# Patient Record
Sex: FEMALE | Race: WHITE | Employment: UNEMPLOYED | URBAN - METROPOLITAN AREA
[De-identification: names, ages, dates, MRNs, and addresses within clinical notes are randomized per-mention and may not be internally consistent; named-entity substitution may affect disease eponyms.]

---

## 2017-02-07 DIAGNOSIS — F32.1 MAJOR DEPRESSIVE DISORDER, SINGLE EPISODE, MODERATE (HCC): ICD-10-CM

## 2017-02-07 DIAGNOSIS — F41.1 GENERALIZED ANXIETY DISORDER: ICD-10-CM

## 2017-02-07 RX ORDER — VENLAFAXINE HYDROCHLORIDE 37.5 MG/1
37.5 CAPSULE, EXTENDED RELEASE ORAL DAILY
Qty: 30 CAPSULE | Refills: 2 | Status: SHIPPED | OUTPATIENT
Start: 2017-02-07 | End: 2017-05-09 | Stop reason: SDUPTHER

## 2017-04-24 DIAGNOSIS — G40.309 GENERALIZED CONVULSIVE EPILEPSY WITHOUT INTRACTABLE EPILEPSY (HCC): ICD-10-CM

## 2017-04-24 RX ORDER — PHENYTOIN SODIUM 100 MG/1
200 CAPSULE, EXTENDED RELEASE ORAL 2 TIMES DAILY
Qty: 120 CAPSULE | Refills: 0 | OUTPATIENT
Start: 2017-04-24 | End: 2018-04-24

## 2017-04-25 RX ORDER — PHENYTOIN SODIUM 100 MG/1
CAPSULE, EXTENDED RELEASE ORAL
Qty: 120 CAPSULE | Refills: 0 | Status: SHIPPED | OUTPATIENT
Start: 2017-04-25 | End: 2017-05-24 | Stop reason: SDUPTHER

## 2017-05-09 DIAGNOSIS — F41.1 GENERALIZED ANXIETY DISORDER: ICD-10-CM

## 2017-05-09 DIAGNOSIS — F32.1 MAJOR DEPRESSIVE DISORDER, SINGLE EPISODE, MODERATE (HCC): ICD-10-CM

## 2017-05-09 RX ORDER — VENLAFAXINE HYDROCHLORIDE 37.5 MG/1
37.5 CAPSULE, EXTENDED RELEASE ORAL DAILY
Qty: 30 CAPSULE | Refills: 0 | Status: SHIPPED | OUTPATIENT
Start: 2017-05-09 | End: 2017-06-08 | Stop reason: SDUPTHER

## 2017-05-24 DIAGNOSIS — G40.309 GENERALIZED CONVULSIVE EPILEPSY WITHOUT INTRACTABLE EPILEPSY (HCC): ICD-10-CM

## 2017-05-25 RX ORDER — PHENYTOIN SODIUM 100 MG/1
CAPSULE, EXTENDED RELEASE ORAL
Qty: 120 CAPSULE | Refills: 1 | Status: SHIPPED | OUTPATIENT
Start: 2017-05-25 | End: 2017-06-29 | Stop reason: SDUPTHER

## 2017-06-08 DIAGNOSIS — F32.1 MAJOR DEPRESSIVE DISORDER, SINGLE EPISODE, MODERATE (HCC): ICD-10-CM

## 2017-06-08 DIAGNOSIS — F41.1 GENERALIZED ANXIETY DISORDER: ICD-10-CM

## 2017-06-08 RX ORDER — VENLAFAXINE HYDROCHLORIDE 37.5 MG/1
37.5 CAPSULE, EXTENDED RELEASE ORAL DAILY
Qty: 30 CAPSULE | Refills: 0 | Status: SHIPPED | OUTPATIENT
Start: 2017-06-08 | End: 2017-06-16 | Stop reason: SDUPTHER

## 2017-06-16 ENCOUNTER — OFFICE VISIT (OUTPATIENT)
Dept: FAMILY MEDICINE CLINIC | Age: 57
End: 2017-06-16
Payer: MEDICAID

## 2017-06-16 VITALS
TEMPERATURE: 97.6 F | DIASTOLIC BLOOD PRESSURE: 74 MMHG | HEART RATE: 82 BPM | HEIGHT: 62 IN | SYSTOLIC BLOOD PRESSURE: 120 MMHG | OXYGEN SATURATION: 98 % | RESPIRATION RATE: 16 BRPM | WEIGHT: 108 LBS | BODY MASS INDEX: 19.88 KG/M2

## 2017-06-16 DIAGNOSIS — F41.1 GENERALIZED ANXIETY DISORDER: ICD-10-CM

## 2017-06-16 DIAGNOSIS — G89.29 CHRONIC LEFT-SIDED LOW BACK PAIN WITH LEFT-SIDED SCIATICA: ICD-10-CM

## 2017-06-16 DIAGNOSIS — Z12.11 SCREENING FOR COLON CANCER: ICD-10-CM

## 2017-06-16 DIAGNOSIS — F32.1 MAJOR DEPRESSIVE DISORDER, SINGLE EPISODE, MODERATE (HCC): Primary | Chronic | ICD-10-CM

## 2017-06-16 DIAGNOSIS — R56.9 SEIZURES (HCC): Chronic | ICD-10-CM

## 2017-06-16 DIAGNOSIS — M54.42 CHRONIC LEFT-SIDED LOW BACK PAIN WITH LEFT-SIDED SCIATICA: ICD-10-CM

## 2017-06-16 DIAGNOSIS — Z01.89 ROUTINE LAB DRAW: ICD-10-CM

## 2017-06-16 PROCEDURE — 99214 OFFICE O/P EST MOD 30 MIN: CPT | Performed by: NURSE PRACTITIONER

## 2017-06-16 RX ORDER — VENLAFAXINE HYDROCHLORIDE 37.5 MG/1
75 CAPSULE, EXTENDED RELEASE ORAL DAILY
Qty: 60 CAPSULE | Refills: 1 | Status: SHIPPED | OUTPATIENT
Start: 2017-06-16 | End: 2017-06-27 | Stop reason: SDUPTHER

## 2017-06-16 ASSESSMENT — ENCOUNTER SYMPTOMS
BLOOD IN STOOL: 0
BACK PAIN: 1
VOICE CHANGE: 1
SHORTNESS OF BREATH: 0
SINUS PRESSURE: 0
COUGH: 0
SORE THROAT: 0
VOMITING: 0
EYES NEGATIVE: 1
NAUSEA: 0
ABDOMINAL PAIN: 0
RHINORRHEA: 0
CONSTIPATION: 0
DIARRHEA: 0

## 2017-06-16 ASSESSMENT — PATIENT HEALTH QUESTIONNAIRE - PHQ9
1. LITTLE INTEREST OR PLEASURE IN DOING THINGS: 0
SUM OF ALL RESPONSES TO PHQ9 QUESTIONS 1 & 2: 0
SUM OF ALL RESPONSES TO PHQ QUESTIONS 1-9: 0
2. FEELING DOWN, DEPRESSED OR HOPELESS: 0

## 2017-06-26 ENCOUNTER — HOSPITAL ENCOUNTER (OUTPATIENT)
Facility: CLINIC | Age: 57
Discharge: HOME | End: 2017-06-26
Payer: MEDICAID

## 2017-06-26 ENCOUNTER — HOSPITAL ENCOUNTER (OUTPATIENT)
Dept: GENERAL RADIOLOGY | Facility: CLINIC | Age: 57
Discharge: HOME | End: 2017-06-26
Payer: MEDICAID

## 2017-06-26 DIAGNOSIS — G89.29 CHRONIC IDIOPATHIC ANAL PAIN: ICD-10-CM

## 2017-06-26 DIAGNOSIS — K62.89 CHRONIC IDIOPATHIC ANAL PAIN: ICD-10-CM

## 2017-06-26 DIAGNOSIS — M54.42 ACUTE BACK PAIN WITH SCIATICA, LEFT: ICD-10-CM

## 2017-06-26 PROCEDURE — 72110 X-RAY EXAM L-2 SPINE 4/>VWS: CPT

## 2017-06-27 ENCOUNTER — TELEPHONE (OUTPATIENT)
Dept: FAMILY MEDICINE CLINIC | Age: 57
End: 2017-06-27

## 2017-06-27 DIAGNOSIS — F32.1 MAJOR DEPRESSIVE DISORDER, SINGLE EPISODE, MODERATE (HCC): Chronic | ICD-10-CM

## 2017-06-27 DIAGNOSIS — R93.7 ABNORMAL X-RAY OF LUMBAR SPINE: ICD-10-CM

## 2017-06-27 DIAGNOSIS — F41.1 GENERALIZED ANXIETY DISORDER: ICD-10-CM

## 2017-06-27 DIAGNOSIS — M48.061 NARROWING OF LUMBAR SPINE: Primary | ICD-10-CM

## 2017-06-27 RX ORDER — VENLAFAXINE HYDROCHLORIDE 75 MG/1
75 CAPSULE, EXTENDED RELEASE ORAL DAILY
Qty: 30 CAPSULE | Refills: 1 | Status: SHIPPED | OUTPATIENT
Start: 2017-06-27 | End: 2017-08-11 | Stop reason: SDUPTHER

## 2017-06-29 ENCOUNTER — HOSPITAL ENCOUNTER (OUTPATIENT)
Age: 57
Setting detail: SPECIMEN
Discharge: HOME | End: 2017-06-29
Payer: MEDICAID

## 2017-06-29 ENCOUNTER — OFFICE VISIT (OUTPATIENT)
Dept: NEUROLOGY | Age: 57
End: 2017-06-29
Payer: MEDICAID

## 2017-06-29 VITALS
WEIGHT: 109.2 LBS | HEIGHT: 62 IN | DIASTOLIC BLOOD PRESSURE: 75 MMHG | HEART RATE: 100 BPM | SYSTOLIC BLOOD PRESSURE: 112 MMHG | BODY MASS INDEX: 20.09 KG/M2

## 2017-06-29 DIAGNOSIS — G40.309 GENERALIZED CONVULSIVE EPILEPSY WITHOUT INTRACTABLE EPILEPSY (HCC): Primary | ICD-10-CM

## 2017-06-29 DIAGNOSIS — G40.309 GENERALIZED CONVULSIVE EPILEPSY WITHOUT INTRACTABLE EPILEPSY (HCC): ICD-10-CM

## 2017-06-29 DIAGNOSIS — G56.21 ULNAR NEUROPATHY OF RIGHT UPPER EXTREMITY: ICD-10-CM

## 2017-06-29 DIAGNOSIS — G56.01 CARPAL TUNNEL SYNDROME OF RIGHT WRIST: ICD-10-CM

## 2017-06-29 DIAGNOSIS — Z51.81 SUBTHERAPEUTIC PHENYTOIN LEVEL: ICD-10-CM

## 2017-06-29 PROBLEM — R78.89 SUBTHERAPEUTIC PHENYTOIN LEVEL: Status: ACTIVE | Noted: 2017-06-29

## 2017-06-29 LAB
PHENYTOIN DATE LAST DOSE: ABNORMAL
PHENYTOIN DOSE AMOUNT: ABNORMAL
PHENYTOIN DOSE TIME: ABNORMAL
PHENYTOIN FREE: 0.6 UG/ML (ref 1–2)
PHENYTOIN LEVEL: 6.3 UG/ML (ref 10–20)

## 2017-06-29 PROCEDURE — 99214 OFFICE O/P EST MOD 30 MIN: CPT | Performed by: PSYCHIATRY & NEUROLOGY

## 2017-06-29 RX ORDER — PHENYTOIN SODIUM 100 MG/1
CAPSULE, EXTENDED RELEASE ORAL
Qty: 120 CAPSULE | Refills: 1 | Status: SHIPPED | OUTPATIENT
Start: 2017-06-29 | End: 2017-07-06 | Stop reason: SDUPTHER

## 2017-07-03 ENCOUNTER — TELEPHONE (OUTPATIENT)
Dept: FAMILY MEDICINE CLINIC | Age: 57
End: 2017-07-03

## 2017-07-03 ENCOUNTER — NURSE ONLY (OUTPATIENT)
Dept: FAMILY MEDICINE CLINIC | Age: 57
End: 2017-07-03
Payer: MEDICAID

## 2017-07-03 DIAGNOSIS — Z12.11 SCREENING FOR COLON CANCER: ICD-10-CM

## 2017-07-03 DIAGNOSIS — R19.5 POSITIVE FIT (FECAL IMMUNOCHEMICAL TEST): Primary | ICD-10-CM

## 2017-07-03 LAB
CONTROL: PRESENT
HEMOCCULT STL QL: POSITIVE

## 2017-07-03 PROCEDURE — 82274 ASSAY TEST FOR BLOOD FECAL: CPT | Performed by: NURSE PRACTITIONER

## 2017-07-06 ENCOUNTER — TELEPHONE (OUTPATIENT)
Dept: NEUROLOGY | Age: 57
End: 2017-07-06

## 2017-07-06 DIAGNOSIS — G40.309 GENERALIZED CONVULSIVE EPILEPSY WITHOUT INTRACTABLE EPILEPSY (HCC): Primary | ICD-10-CM

## 2017-07-06 RX ORDER — PHENYTOIN SODIUM 100 MG/1
CAPSULE, EXTENDED RELEASE ORAL
Qty: 150 CAPSULE | Refills: 1 | Status: SHIPPED | OUTPATIENT
Start: 2017-07-06 | End: 2017-09-20 | Stop reason: SDUPTHER

## 2017-07-10 ENCOUNTER — HOSPITAL ENCOUNTER (OUTPATIENT)
Dept: MRI IMAGING | Facility: CLINIC | Age: 57
Discharge: HOME | End: 2017-07-10
Payer: MEDICAID

## 2017-07-10 DIAGNOSIS — R93.7 ABNORMAL X-RAY OF LUMBAR SPINE: ICD-10-CM

## 2017-07-10 DIAGNOSIS — M48.061 NARROWING OF LUMBAR SPINE: ICD-10-CM

## 2017-07-10 PROCEDURE — 72148 MRI LUMBAR SPINE W/O DYE: CPT

## 2017-07-13 DIAGNOSIS — M51.36 LUMBAR DISC NARROWING: Primary | ICD-10-CM

## 2017-07-13 DIAGNOSIS — M51.36 BULGING LUMBAR DISC: ICD-10-CM

## 2017-08-11 DIAGNOSIS — F41.1 GENERALIZED ANXIETY DISORDER: ICD-10-CM

## 2017-08-11 DIAGNOSIS — F32.1 MAJOR DEPRESSIVE DISORDER, SINGLE EPISODE, MODERATE (HCC): Chronic | ICD-10-CM

## 2017-08-11 RX ORDER — VENLAFAXINE HYDROCHLORIDE 75 MG/1
75 CAPSULE, EXTENDED RELEASE ORAL DAILY
Qty: 30 CAPSULE | Refills: 2 | Status: SHIPPED | OUTPATIENT
Start: 2017-08-11 | End: 2017-08-15 | Stop reason: SDUPTHER

## 2017-08-14 ENCOUNTER — TELEPHONE (OUTPATIENT)
Dept: FAMILY MEDICINE CLINIC | Age: 57
End: 2017-08-14

## 2017-08-14 DIAGNOSIS — F41.1 GENERALIZED ANXIETY DISORDER: ICD-10-CM

## 2017-08-14 DIAGNOSIS — F32.1 MAJOR DEPRESSIVE DISORDER, SINGLE EPISODE, MODERATE (HCC): Chronic | ICD-10-CM

## 2017-08-15 RX ORDER — VENLAFAXINE HYDROCHLORIDE 75 MG/1
75 CAPSULE, EXTENDED RELEASE ORAL DAILY
Qty: 10 CAPSULE | Refills: 0 | Status: SHIPPED | OUTPATIENT
Start: 2017-08-15 | End: 2017-08-22 | Stop reason: SDUPTHER

## 2017-08-16 ENCOUNTER — OFFICE VISIT (OUTPATIENT)
Dept: GASTROENTEROLOGY | Age: 57
End: 2017-08-16
Payer: MEDICAID

## 2017-08-16 VITALS
WEIGHT: 107.4 LBS | SYSTOLIC BLOOD PRESSURE: 105 MMHG | BODY MASS INDEX: 19.77 KG/M2 | HEART RATE: 58 BPM | OXYGEN SATURATION: 100 % | HEIGHT: 62 IN | TEMPERATURE: 98.1 F | DIASTOLIC BLOOD PRESSURE: 77 MMHG | RESPIRATION RATE: 14 BRPM

## 2017-08-16 DIAGNOSIS — R19.5 POSITIVE FIT (FECAL IMMUNOCHEMICAL TEST): ICD-10-CM

## 2017-08-16 DIAGNOSIS — C32.0 VOCAL CORD CANCER (HCC): Primary | ICD-10-CM

## 2017-08-16 PROCEDURE — 99244 OFF/OP CNSLTJ NEW/EST MOD 40: CPT | Performed by: INTERNAL MEDICINE

## 2017-08-16 RX ORDER — GABAPENTIN 100 MG/1
100 CAPSULE ORAL 3 TIMES DAILY
COMMUNITY
End: 2017-08-22 | Stop reason: DRUGHIGH

## 2017-08-16 ASSESSMENT — ENCOUNTER SYMPTOMS
GASTROINTESTINAL NEGATIVE: 1
VOMITING: 0
EYES NEGATIVE: 1
DIARRHEA: 0
RECTAL PAIN: 0
BACK PAIN: 1
CONSTIPATION: 0
ABDOMINAL PAIN: 0
BLOOD IN STOOL: 0
ANAL BLEEDING: 0
NAUSEA: 0
TROUBLE SWALLOWING: 0
RESPIRATORY NEGATIVE: 1
ALLERGIC/IMMUNOLOGIC NEGATIVE: 1
ABDOMINAL DISTENTION: 0
VOICE CHANGE: 1

## 2017-08-22 ENCOUNTER — OFFICE VISIT (OUTPATIENT)
Dept: FAMILY MEDICINE CLINIC | Age: 57
End: 2017-08-22
Payer: MEDICAID

## 2017-08-22 VITALS
RESPIRATION RATE: 16 BRPM | WEIGHT: 108 LBS | SYSTOLIC BLOOD PRESSURE: 128 MMHG | DIASTOLIC BLOOD PRESSURE: 74 MMHG | HEART RATE: 84 BPM | BODY MASS INDEX: 19.75 KG/M2 | TEMPERATURE: 98.7 F

## 2017-08-22 DIAGNOSIS — F32.1 MAJOR DEPRESSIVE DISORDER, SINGLE EPISODE, MODERATE (HCC): Primary | Chronic | ICD-10-CM

## 2017-08-22 DIAGNOSIS — F41.1 GENERALIZED ANXIETY DISORDER: ICD-10-CM

## 2017-08-22 PROBLEM — M51.26 HERNIATED LUMBAR INTERVERTEBRAL DISC: Status: ACTIVE | Noted: 2017-08-09

## 2017-08-22 PROCEDURE — 99214 OFFICE O/P EST MOD 30 MIN: CPT | Performed by: NURSE PRACTITIONER

## 2017-08-22 RX ORDER — VENLAFAXINE 37.5 MG/1
37.5 TABLET ORAL DAILY
Qty: 30 TABLET | Refills: 3 | Status: SHIPPED | OUTPATIENT
Start: 2017-08-22 | End: 2017-09-20 | Stop reason: DRUGHIGH

## 2017-08-22 RX ORDER — GABAPENTIN 300 MG/1
600 CAPSULE ORAL 3 TIMES DAILY
COMMUNITY
Start: 2017-08-09 | End: 2017-11-10 | Stop reason: SDUPTHER

## 2017-08-22 RX ORDER — POLYETHYLENE GLYCOL 3350 17 G/17G
POWDER, FOR SOLUTION ORAL
Qty: 255 G | Refills: 0 | Status: SHIPPED | OUTPATIENT
Start: 2017-08-22 | End: 2017-09-15

## 2017-08-22 RX ORDER — VENLAFAXINE HYDROCHLORIDE 75 MG/1
75 CAPSULE, EXTENDED RELEASE ORAL DAILY
Qty: 30 CAPSULE | Refills: 3 | Status: SHIPPED | OUTPATIENT
Start: 2017-08-22 | End: 2017-09-20 | Stop reason: SDUPTHER

## 2017-08-22 ASSESSMENT — ENCOUNTER SYMPTOMS
EYES NEGATIVE: 1
WHEEZING: 0
DIARRHEA: 0
CONSTIPATION: 0
COUGH: 0
SINUS PRESSURE: 0
VOMITING: 0
RHINORRHEA: 0
BLOOD IN STOOL: 0
ABDOMINAL PAIN: 0
NAUSEA: 0
SORE THROAT: 0
VOICE CHANGE: 1
BACK PAIN: 1
SHORTNESS OF BREATH: 0

## 2017-09-20 ENCOUNTER — OFFICE VISIT (OUTPATIENT)
Dept: FAMILY MEDICINE CLINIC | Age: 57
End: 2017-09-20
Payer: MEDICAID

## 2017-09-20 VITALS
WEIGHT: 106 LBS | HEART RATE: 96 BPM | RESPIRATION RATE: 20 BRPM | BODY MASS INDEX: 19.39 KG/M2 | TEMPERATURE: 98.7 F | SYSTOLIC BLOOD PRESSURE: 124 MMHG | DIASTOLIC BLOOD PRESSURE: 82 MMHG

## 2017-09-20 DIAGNOSIS — G40.309 GENERALIZED CONVULSIVE EPILEPSY WITHOUT INTRACTABLE EPILEPSY (HCC): ICD-10-CM

## 2017-09-20 DIAGNOSIS — F41.1 GENERALIZED ANXIETY DISORDER: ICD-10-CM

## 2017-09-20 DIAGNOSIS — F32.1 MAJOR DEPRESSIVE DISORDER, SINGLE EPISODE, MODERATE (HCC): Chronic | ICD-10-CM

## 2017-09-20 PROCEDURE — 99214 OFFICE O/P EST MOD 30 MIN: CPT | Performed by: NURSE PRACTITIONER

## 2017-09-20 RX ORDER — VENLAFAXINE HYDROCHLORIDE 75 MG/1
150 CAPSULE, EXTENDED RELEASE ORAL DAILY
Qty: 60 CAPSULE | Refills: 3 | Status: SHIPPED | OUTPATIENT
Start: 2017-09-20 | End: 2018-01-17 | Stop reason: SDUPTHER

## 2017-09-20 RX ORDER — PHENYTOIN SODIUM 100 MG/1
CAPSULE, EXTENDED RELEASE ORAL
Qty: 150 CAPSULE | Refills: 2 | Status: SHIPPED | OUTPATIENT
Start: 2017-09-20 | End: 2017-12-15 | Stop reason: SDUPTHER

## 2017-09-20 ASSESSMENT — ENCOUNTER SYMPTOMS
CONSTIPATION: 0
BLOOD IN STOOL: 0
SORE THROAT: 0
ABDOMINAL PAIN: 0
VOICE CHANGE: 1
SHORTNESS OF BREATH: 0
BACK PAIN: 1
RHINORRHEA: 0
WHEEZING: 0
EYES NEGATIVE: 1
COUGH: 0
NAUSEA: 0
SINUS PRESSURE: 0
DIARRHEA: 0
VOMITING: 0

## 2017-10-03 ENCOUNTER — ANESTHESIA EVENT (OUTPATIENT)
Dept: OPERATING ROOM | Age: 57
End: 2017-10-03
Payer: MEDICAID

## 2017-10-04 ENCOUNTER — ANESTHESIA (OUTPATIENT)
Dept: OPERATING ROOM | Age: 57
End: 2017-10-04
Payer: MEDICAID

## 2017-10-04 ENCOUNTER — HOSPITAL ENCOUNTER (OUTPATIENT)
Age: 57
Setting detail: OUTPATIENT SURGERY
Discharge: HOME | End: 2017-10-04
Attending: INTERNAL MEDICINE | Admitting: INTERNAL MEDICINE
Payer: MEDICAID

## 2017-10-04 VITALS
DIASTOLIC BLOOD PRESSURE: 68 MMHG | OXYGEN SATURATION: 97 % | RESPIRATION RATE: 14 BRPM | SYSTOLIC BLOOD PRESSURE: 136 MMHG

## 2017-10-04 VITALS
RESPIRATION RATE: 20 BRPM | WEIGHT: 105 LBS | OXYGEN SATURATION: 98 % | HEIGHT: 62 IN | DIASTOLIC BLOOD PRESSURE: 68 MMHG | HEART RATE: 82 BPM | BODY MASS INDEX: 19.32 KG/M2 | TEMPERATURE: 98.4 F | SYSTOLIC BLOOD PRESSURE: 134 MMHG

## 2017-10-04 PROCEDURE — 3700000000 HC ANESTHESIA ATTENDED CARE: Performed by: INTERNAL MEDICINE

## 2017-10-04 PROCEDURE — 3609010300 HC COLONOSCOPY W/BIOPSY SINGLE/MULTIPLE: Performed by: INTERNAL MEDICINE

## 2017-10-04 PROCEDURE — 88305 TISSUE EXAM BY PATHOLOGIST: CPT

## 2017-10-04 PROCEDURE — 2580000003 HC RX 258: Performed by: NURSE ANESTHETIST, CERTIFIED REGISTERED

## 2017-10-04 PROCEDURE — 3700000001 HC ADD 15 MINUTES (ANESTHESIA): Performed by: INTERNAL MEDICINE

## 2017-10-04 PROCEDURE — 7100000011 HC PHASE II RECOVERY - ADDTL 15 MIN: Performed by: INTERNAL MEDICINE

## 2017-10-04 PROCEDURE — 6360000002 HC RX W HCPCS: Performed by: NURSE ANESTHETIST, CERTIFIED REGISTERED

## 2017-10-04 PROCEDURE — 2500000003 HC RX 250 WO HCPCS: Performed by: NURSE ANESTHETIST, CERTIFIED REGISTERED

## 2017-10-04 PROCEDURE — 7100000010 HC PHASE II RECOVERY - FIRST 15 MIN: Performed by: INTERNAL MEDICINE

## 2017-10-04 PROCEDURE — 2580000003 HC RX 258: Performed by: ANESTHESIOLOGY

## 2017-10-04 RX ORDER — SODIUM CHLORIDE 9 MG/ML
INJECTION, SOLUTION INTRAVENOUS CONTINUOUS
Status: DISCONTINUED | OUTPATIENT
Start: 2017-10-05 | End: 2017-10-04

## 2017-10-04 RX ORDER — FENTANYL CITRATE 50 UG/ML
25 INJECTION, SOLUTION INTRAMUSCULAR; INTRAVENOUS EVERY 5 MIN PRN
Status: DISCONTINUED | OUTPATIENT
Start: 2017-10-04 | End: 2017-10-04 | Stop reason: HOSPADM

## 2017-10-04 RX ORDER — HYDROCODONE BITARTRATE AND ACETAMINOPHEN 5; 325 MG/1; MG/1
1 TABLET ORAL EVERY 12 HOURS PRN
COMMUNITY
End: 2017-11-22

## 2017-10-04 RX ORDER — SODIUM CHLORIDE 0.9 % (FLUSH) 0.9 %
10 SYRINGE (ML) INJECTION PRN
Status: DISCONTINUED | OUTPATIENT
Start: 2017-10-04 | End: 2017-10-04 | Stop reason: HOSPADM

## 2017-10-04 RX ORDER — SODIUM CHLORIDE, SODIUM LACTATE, POTASSIUM CHLORIDE, CALCIUM CHLORIDE 600; 310; 30; 20 MG/100ML; MG/100ML; MG/100ML; MG/100ML
INJECTION, SOLUTION INTRAVENOUS CONTINUOUS PRN
Status: DISCONTINUED | OUTPATIENT
Start: 2017-10-04 | End: 2017-10-04 | Stop reason: SDUPTHER

## 2017-10-04 RX ORDER — LIDOCAINE HYDROCHLORIDE 20 MG/ML
INJECTION, SOLUTION EPIDURAL; INFILTRATION; INTRACAUDAL; PERINEURAL PRN
Status: DISCONTINUED | OUTPATIENT
Start: 2017-10-04 | End: 2017-10-04 | Stop reason: SDUPTHER

## 2017-10-04 RX ORDER — PROPOFOL 10 MG/ML
INJECTION, EMULSION INTRAVENOUS PRN
Status: DISCONTINUED | OUTPATIENT
Start: 2017-10-04 | End: 2017-10-04 | Stop reason: SDUPTHER

## 2017-10-04 RX ORDER — SODIUM CHLORIDE 0.9 % (FLUSH) 0.9 %
10 SYRINGE (ML) INJECTION EVERY 12 HOURS SCHEDULED
Status: DISCONTINUED | OUTPATIENT
Start: 2017-10-04 | End: 2017-10-04 | Stop reason: HOSPADM

## 2017-10-04 RX ORDER — SODIUM CHLORIDE, SODIUM LACTATE, POTASSIUM CHLORIDE, CALCIUM CHLORIDE 600; 310; 30; 20 MG/100ML; MG/100ML; MG/100ML; MG/100ML
INJECTION, SOLUTION INTRAVENOUS CONTINUOUS
Status: DISCONTINUED | OUTPATIENT
Start: 2017-10-05 | End: 2017-10-04 | Stop reason: HOSPADM

## 2017-10-04 RX ORDER — FENTANYL CITRATE 50 UG/ML
50 INJECTION, SOLUTION INTRAMUSCULAR; INTRAVENOUS EVERY 5 MIN PRN
Status: DISCONTINUED | OUTPATIENT
Start: 2017-10-04 | End: 2017-10-04 | Stop reason: HOSPADM

## 2017-10-04 RX ORDER — LIDOCAINE HYDROCHLORIDE 10 MG/ML
1 INJECTION, SOLUTION EPIDURAL; INFILTRATION; INTRACAUDAL; PERINEURAL
Status: DISCONTINUED | OUTPATIENT
Start: 2017-10-04 | End: 2017-10-04 | Stop reason: HOSPADM

## 2017-10-04 RX ORDER — ONDANSETRON 2 MG/ML
4 INJECTION INTRAMUSCULAR; INTRAVENOUS
Status: DISCONTINUED | OUTPATIENT
Start: 2017-10-04 | End: 2017-10-04 | Stop reason: HOSPADM

## 2017-10-04 RX ADMIN — PROPOFOL 50 MG: 10 INJECTION, EMULSION INTRAVENOUS at 09:31

## 2017-10-04 RX ADMIN — PROPOFOL 100 MG: 10 INJECTION, EMULSION INTRAVENOUS at 09:17

## 2017-10-04 RX ADMIN — PROPOFOL 50 MG: 10 INJECTION, EMULSION INTRAVENOUS at 09:38

## 2017-10-04 RX ADMIN — PROPOFOL 50 MG: 10 INJECTION, EMULSION INTRAVENOUS at 09:18

## 2017-10-04 RX ADMIN — PROPOFOL 50 MG: 10 INJECTION, EMULSION INTRAVENOUS at 09:42

## 2017-10-04 RX ADMIN — PROPOFOL 30 MG: 10 INJECTION, EMULSION INTRAVENOUS at 09:21

## 2017-10-04 RX ADMIN — PROPOFOL 50 MG: 10 INJECTION, EMULSION INTRAVENOUS at 09:45

## 2017-10-04 RX ADMIN — PROPOFOL 50 MG: 10 INJECTION, EMULSION INTRAVENOUS at 09:27

## 2017-10-04 RX ADMIN — SODIUM CHLORIDE, POTASSIUM CHLORIDE, SODIUM LACTATE AND CALCIUM CHLORIDE: 600; 310; 30; 20 INJECTION, SOLUTION INTRAVENOUS at 09:15

## 2017-10-04 RX ADMIN — PROPOFOL 50 MG: 10 INJECTION, EMULSION INTRAVENOUS at 09:40

## 2017-10-04 RX ADMIN — PROPOFOL 50 MG: 10 INJECTION, EMULSION INTRAVENOUS at 09:33

## 2017-10-04 RX ADMIN — PROPOFOL 50 MG: 10 INJECTION, EMULSION INTRAVENOUS at 09:22

## 2017-10-04 RX ADMIN — SODIUM CHLORIDE, POTASSIUM CHLORIDE, SODIUM LACTATE AND CALCIUM CHLORIDE: 600; 310; 30; 20 INJECTION, SOLUTION INTRAVENOUS at 08:48

## 2017-10-04 RX ADMIN — PROPOFOL 50 MG: 10 INJECTION, EMULSION INTRAVENOUS at 09:25

## 2017-10-04 RX ADMIN — PROPOFOL 50 MG: 10 INJECTION, EMULSION INTRAVENOUS at 09:29

## 2017-10-04 RX ADMIN — PROPOFOL 50 MG: 10 INJECTION, EMULSION INTRAVENOUS at 09:19

## 2017-10-04 RX ADMIN — PROPOFOL 50 MG: 10 INJECTION, EMULSION INTRAVENOUS at 09:48

## 2017-10-04 RX ADMIN — PROPOFOL 20 MG: 10 INJECTION, EMULSION INTRAVENOUS at 09:23

## 2017-10-04 RX ADMIN — PROPOFOL 50 MG: 10 INJECTION, EMULSION INTRAVENOUS at 09:35

## 2017-10-04 RX ADMIN — LIDOCAINE HYDROCHLORIDE 100 MG: 20 INJECTION, SOLUTION EPIDURAL; INFILTRATION; INTRACAUDAL; PERINEURAL at 09:17

## 2017-10-04 ASSESSMENT — PAIN SCALES - GENERAL
PAINLEVEL_OUTOF10: 0

## 2017-10-04 ASSESSMENT — PAIN - FUNCTIONAL ASSESSMENT: PAIN_FUNCTIONAL_ASSESSMENT: 0-10

## 2017-10-04 ASSESSMENT — PAIN DESCRIPTION - DESCRIPTORS: DESCRIPTORS: THROBBING

## 2017-10-04 NOTE — IP AVS SNAPSHOT
Patient Information     Patient Name AMAURI Burr 1960         This is your updated medication list to keep with you all times      TAKE these medications     gabapentin 300 MG capsule   Commonly known as:  NEURONTIN       HYDROcodone-acetaminophen 5-325 MG per tablet   Commonly known as:  NORCO       phenytoin 100 MG ER capsule   Commonly known as:  DILANTIN   TAKE TWO CAPSULES BY MOUTH IN THE MORNING AND THREE CAPSULES AT NIGHT       venlafaxine 75 MG extended release capsule   Commonly known as:  EFFEXOR XR   Take 2 capsules by mouth daily

## 2017-10-04 NOTE — ANESTHESIA PRE PROCEDURE
Department of Anesthesiology  Preprocedure Note       Name:  Yanci Rodriguez   Age:  62 y.o.  :  1960                                          MRN:  [id number]         Date:  10/4/2017      Surgeon: Ritika Bowers):  Tadeo Keene MD    Procedure: Procedure(s):  COLONOSCOPY    Medications prior to admission:   Prior to Admission medications    Medication Sig Start Date End Date Taking? Authorizing Provider   HYDROcodone-acetaminophen (NORCO) 5-325 MG per tablet Take 1 tablet by mouth every 12 hours as needed for Pain .    Yes Historical Provider, MD   phenytoin (DILANTIN) 100 MG ER capsule TAKE TWO CAPSULES BY MOUTH IN THE MORNING AND THREE CAPSULES AT NIGHT 17  Yes Tahir Lozano MD   venlafaxine (EFFEXOR XR) 75 MG extended release capsule Take 2 capsules by mouth daily 17 Yes Sarah Leal CNP   gabapentin (NEURONTIN) 300 MG capsule Take 600 mg by mouth 3 times daily 17 Yes Historical Provider, MD       Current medications:    Current Facility-Administered Medications   Medication Dose Route Frequency Provider Last Rate Last Dose    [START ON 10/5/2017] lactated ringers infusion   Intravenous Continuous Ernesto Guerrier  mL/hr at 10/04/17 0848      sodium chloride flush 0.9 % injection 10 mL  10 mL Intravenous 2 times per day Ernesto Guerrier MD        sodium chloride flush 0.9 % injection 10 mL  10 mL Intravenous PRN Ernesto Guerrier MD        lidocaine PF 1 % injection 1 mL  1 mL Intradermal Once PRN Ernesto Guerrier MD           Allergies:  No Known Allergies    Problem List:    Patient Active Problem List   Diagnosis Code    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    Seizures ([address] R56.9    Anxiety F41.9    Depression F32.9    Hypoglycemia E16.2    S/P radiation therapy within last four weeks Z92.3    Major depressive disorder, single episode, moderate ([address] F32.1    Ulnar neuropathy of right upper extremity G56.21    Carpal tunnel syndrome of right wrist G56.01    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    Convulsions ([address] R56.9    Generalized convulsive epilepsy without intractable epilepsy (HCC) G40.309    Subtherapeutic phenytoin level Z51.81    Positive FIT (fecal immunochemical test) R19.5    Herniated lumbar intervertebral disc M51.26       Past Medical History:        Diagnosis Date    Anemia     Anxiety     Arthritis     COPD (chronic obstructive pulmonary disease) ([address]     G tube feedings ([facility] [address] 2/2016    every 3-4 hours    History of blood transfusion 1990    x 6 during child birth    Hypoglycemia     Insomnia     Seizures ([facility] [address] [facility]     Squamous cell carcinoma of vocal cord ([facility] [address] 11/17/2015    with history of radiation treatments       Past Surgical History:        Procedure Laterality Date    BRONCHOSCOPY  11/5/15    DENTAL SURGERY Bilateral     LARYNGOSCOPY Right 11/5/15    UPPER GASTROINTESTINAL ENDOSCOPY  11/5/15    VOCAL CORD AUGMENTATION W/RADIESSE INJ Bilateral        Social History:    Social History   Substance Use Topics    Smoking status: Former Smoker     Packs/day: 1.50     Years: 25.00     Types: Cigarettes     Start date: 1/1/1991     Quit date: 9/29/2016    Smokeless tobacco: Never Used    Alcohol use No                                Counseling given: Not Answered      Vital Signs (Current):   Vitals:    10/04/17 0813 10/04/17 0814   BP: 111/63    Pulse: 90    Resp: 16    Temp: 97.9 °F (36.6 °C)    TempSrc: Oral    SpO2: 99%    Weight:  105 lb (47.6 kg)   Height:  5' 2\" (1.575 m)                                              BP Readings from Last 3 Encounters:   10/04/17 111/63   09/20/17 124/82   08/22/17 128/74       NPO Status: Time of last liquid consumption: 2359                        Time of last solid consumption: 1900                        Date of last liquid consumption: 10/03/17                        Date of last solid food consumption: 10/02/17    BMI:   Wt Readings from Last 3 Encounters: 10/04/17 105 lb (47.6 kg)   09/20/17 106 lb (48.1 kg)   08/22/17 108 lb (49 kg)     Body mass index is 19.2 kg/(m^2). CBC:   Lab Results   Component Value Date    WBC 6.6 04/22/2016    RBC 4.38 04/22/2016    HGB 13.4 04/22/2016    HCT 40.1 04/22/2016    MCV 91.6 04/22/2016    RDW 15.5 04/22/2016     04/22/2016       CMP:   Lab Results   Component Value Date     04/22/2016    K 4.4 04/22/2016    CL 97 04/22/2016    CO2 28 04/22/2016    BUN 22 04/22/2016    CREATININE 0.52 04/22/2016    GFRAA >60 04/22/2016    LABGLOM >60 04/22/2016    GLUCOSE 94 04/22/2016    PROT 7.5 04/22/2016    CALCIUM 9.4 04/22/2016    BILITOT 0.15 04/22/2016    ALKPHOS 104 04/22/2016    AST 17 04/22/2016    ALT 19 04/22/2016       POC Tests: No results for input(s): POCGLU, POCNA, POCK, POCCL, POCBUN, POCHEMO, POCHCT in the last 72 hours. Coags: No results found for: PROTIME, INR, APTT    HCG (If Applicable): No results found for: PREGTESTUR, PREGSERUM, HCG, HCGQUANT     ABGs: No results found for: PHART, PO2ART, NCN2SBU, SDY1EYY, BEART, Q7WCGJDB     Type & Screen (If Applicable):  No results found for: LABABO, LABRH    Anesthesia Evaluation   no history of anesthetic complications:   Airway: Mallampati: II  TM distance: >3 FB   Neck ROM: full  Mouth opening: > = 3 FB Dental:    (+) upper dentures and lower dentures      Pulmonary: breath sounds clear to auscultation  (+) COPD:      (-) asthma, recent URI and sleep apnea    ROS comment: Squamous cell cancer of the vocal cord s/p radiation   Cardiovascular:        (-) pacemaker, hypertension, past MI, dysrhythmias,  angina and  NEWBERRY      Rhythm: regular  Rate: normal                 Neuro/Psych:   (+) seizures: well controlled,    (-) neuromuscular disease and psychiatric history  GI/Hepatic/Renal:        (-) GERD and liver disease     Endo/Other:    (+) : arthritis:.     (-) hypothyroidism, hyperthyroidism    Abdominal:         (-) obese              Anesthesia Plan    ASA 3 MAC     intravenous induction   Anesthetic plan and risks discussed with patient. Plan discussed with CRNA.   Attending anesthesiologist reviewed and agrees with Fina Peres MD   10/4/2017

## 2017-10-04 NOTE — H&P
HISTORY and PHYSICAL    NAME:  Yanci Rodriguez  MRN: [id number]   YOB: 1960   Date: 10/4/2017   Age: 62 y.o. Gender: female         COMPLAINT AND PRESENT HISTORY: Yanci Rodriguez is a 62 y.o. female who is scheduled to have a colonoscopy due to a recent positive FIT test.  The patient denies any abdominal pain, nausea, vomiting, diarrhea, constipation. No dark stools or bleeding. No recent weight loss. No family history of colon cancer. No previous colonoscopy. She has a history of vocal cord cancer diagnosed 11/2015. She has chronic hoarseness. She had a feeding tube placed 2/2016 with subsequent removal.      Diagnosis: Positive FIT test    Past Medical History:   Diagnosis Date    Anemia     Anxiety     Arthritis     [facility]) [facility]     G tube feedings ([facility] 2/2016    every 3-4 hours    Hypoglycemia     Insomnia     Seizures (HCC)     Squamous cell carcinoma of vocal cord ([facility] [facility] 11/17/2015    with history of radiation treatments       Past Surgical History:   Procedure Laterality Date    BRONCHOSCOPY  11/5/15    DENTAL SURGERY Bilateral     LARYNGOSCOPY Right 11/5/15    UPPER GASTROINTESTINAL ENDOSCOPY  11/5/15    VOCAL CORD AUGMENTATION W/RADIESSE INJ Bilateral        Pt denies any history of diabetes, heart disease, stroke or cancer.     Family History   Problem Relation Age of Onset    High Blood Pressure Father     COPD Father     Heart Disease Father     Heart Disease Maternal Grandmother     Diabetes Maternal Grandmother     High Blood Pressure Maternal Grandmother     Heart Disease Maternal Grandfather     Heart Disease Sister     Other Brother      epilepsy    Heart Disease Brother          Social History     Social History    Marital status: Single     Spouse name: N/A    Number of children: N/A    Years of education: N/A     Social History Main Topics    Smoking status: Former Smoker     Packs/day: 1.50     Years: 25.00 Types: Cigarettes     Start date: 1/1/1991     Quit date: 9/29/2016    Smokeless tobacco: Never Used    Alcohol use No    Drug use: 5.00 per week     Special: Marijuana    Sexual activity: No     Other Topics Concern    Not on file     Social History Narrative                 Review of Systems:    No Known Allergies        ROS:    GENERAL HEALTH:  Denies any problems with weight, appetite, or sleep. Skin:  No itching or rashes. No lesions. No easy bruising or bleeding. HEENT:  Denies cephalgia or head injury. No eye or ear pain. No sinusitis, rhinorhea or epistaxis. No frequent sore throat. No current masses  or injury to the neck. Cardio-Respiratory: No hemoptysis, shortness of breath, chest pain, dizziness, orthopnea or palpitations. Gastrointestinal:  No constipation, diarrhea, gracie stools, red or black in the stool. No nausea or vomiting. Genitourinary:  No dysuria, hematuria, discharge, incontinence. No recent urinary tract infection. Locomotor:  Denies bone, joint or muscle  problems. No need for assistance with ambulation. Neuro:  Denies  neuropathy, syncopal episodes, weakness, vertigo, arthralgia, myalgia or numbness or tingling. Behavioral/Psych:  Pt denies current feeling of depression or significant anxiety. GENERAL PHYSICAL EXAM:            Vitals: /63  Pulse 90  Temp 97.9 °F (36.6 °C) (Oral)   Resp 16  Ht 5' 2\" (1.575 m)  Wt 105 lb (47.6 kg)  SpO2 99%  BMI 19.2 kg/m2 Body mass index is 19.2 kg/(m^2). GENERAL APPEARANCE:  Amira Olivas is a 62 y.o. female,  nourished, conscious, alert. Does not appear to be in distress or pain at this time. Skin:  Appears warm and dry without jaundice or cyanosis. No rashes or lesions. HEENT:  Voice is hoarse. No facial swelling or tenderness. No  scleral icterus. No drainage from the ears, eyes or nose. Moist mucous membranes. No jugular vein distention.

## 2017-10-04 NOTE — IP AVS SNAPSHOT
After Visit Summary  (Discharge Instructions)    Medication List for Home    Based on the information you provided to us as well as any changes during this visit, the following is your updated medication list.  Compare this with your prescription bottles at home. If you have any questions or concerns, contact your primary care physician's office. Daily Medication List (This medication list can be shared with any healthcare provider who is helping you manage your medications)      These are medications you told us you were taking at home, CONTINUE taking them after you leave the hospital        Last Dose    Next Dose Due AM NOON PM NIGHT    gabapentin 300 MG capsule   Commonly known as:  NEURONTIN   Take 600 mg by mouth 3 times daily                                         HYDROcodone-acetaminophen 5-325 MG per tablet   Commonly known as:  NORCO   Take 1 tablet by mouth every 12 hours as needed for Pain .                                         phenytoin 100 MG ER capsule   Commonly known as:  DILANTIN   TAKE TWO CAPSULES BY MOUTH IN THE MORNING AND THREE CAPSULES AT NIGHT                                         venlafaxine 75 MG extended release capsule   Commonly known as:  EFFEXOR XR   Take 2 capsules by mouth daily                                                 Allergies as of 10/4/2017     No Known Allergies      Immunizations as of 10/4/2017     No immunizations on file. Last Vitals          Most Recent Value    Temp  98.1 °F (36.7 °C)    Pulse  78    Resp  18    BP  112/62         After Visit Summary    This summary was created for you. Thank you for entrusting your care to us.   The following information includes details about your hospital/visit stay along with steps you should take to help with your recovery once you leave the hospital.  In this packet, you will find information about the topics listed below:    · Instructions about your medications including a list of your home You may resume your normal diet unless otherwise instructed   Try to avoid spicy, greasy, or fried foods for your first meal after the procedure      3. MEDICATIONS   Do not consume alcohol, tranquilizers or sleeping medications for 24 hours unless otherwise advised by your physician. Resume your usual medications unless otherwise instructed. 4. NORMAL CHANGES YOU MAY EXPERIENCE AFTER ENDOSCOPY:   Passing gas for several hours is normal   Some mild abdominal cramping is normal   If a biopsy/polypectomy was done, you may see some spotting of blood   You may feel tired or worn out for the next 24-48 hours due to the prep and sedation      5. CALL YOUR PHYSICIAN IF YOU EXPERIENCE ANY OF THE FOLLOWING   Vomiting blood or passing blood rectally (color may be red or black)   Severe abdominal pain or tenderness (that is not relieved by passing air)   Fever, chills, or excessive sweating   Persistent nausea or vomiting   Redness or swelling at the IV site      6. ADDITIONAL INSTRUCTIONS      IF YOU HAVE ANY QUESTIONS OR CONCERNS PLEASE CALL YOUR DOCTOR,  IF YOU FEEL YOU HAVE A MEDICAL EMERGENCY PLEASE DIAL 911         Important information for a smoker       SMOKING: QUIT SMOKING. THIS IS THE MOST IMPORTANT ACTION YOU CAN TAKE TO IMPROVE YOUR CURRENT AND FUTURE HEALTH. Call the [address] [place] [address] at [place] NOW ([phone])    Smoking harms nonsmokers. When nonsmokers are around people who smoke, they absorb nicotine, carbon monoxide, and other ingredients of tobacco smoke. DO NOT SMOKE AROUND CHILDREN     Children exposed to secondhand smoke are at an increased risk of:  Sudden Infant Death Syndrome (SIDS), acute respiratory infections, inflammation of the middle ear, and severe asthma. Over a longer time, it causes heart disease and lung cancer. There is no safe level of exposure to secondhand smoke.                 [facility]

## 2017-10-05 ENCOUNTER — HOSPITAL ENCOUNTER (OUTPATIENT)
Dept: PAIN MANAGEMENT | Age: 57
Discharge: HOME | End: 2017-10-05
Payer: MEDICAID

## 2017-10-05 VITALS
RESPIRATION RATE: 18 BRPM | TEMPERATURE: 98.2 F | DIASTOLIC BLOOD PRESSURE: 84 MMHG | SYSTOLIC BLOOD PRESSURE: 137 MMHG | HEART RATE: 80 BPM

## 2017-10-05 LAB — SURGICAL PATHOLOGY REPORT: NORMAL

## 2017-10-05 PROCEDURE — 99204 OFFICE O/P NEW MOD 45 MIN: CPT

## 2017-10-05 PROCEDURE — 99203 OFFICE O/P NEW LOW 30 MIN: CPT | Performed by: PAIN MEDICINE

## 2017-10-05 ASSESSMENT — PAIN SCALES - GENERAL: PAINLEVEL_OUTOF10: 4

## 2017-10-05 ASSESSMENT — ENCOUNTER SYMPTOMS
JAUNDICE: 0
UNUSUAL HAIR DISTRIBUTION: 0
BACK PAIN: 1
SUSPICIOUS LESIONS: 0
SPUTUM PRODUCTION: 0
HEMOPTYSIS: 0
BOWEL INCONTINENCE: 0
EYE DISCHARGE: 0
STRIDOR: 0

## 2017-10-05 ASSESSMENT — PAIN DESCRIPTION - PAIN TYPE: TYPE: CHRONIC PAIN

## 2017-10-05 ASSESSMENT — PAIN DESCRIPTION - FREQUENCY: FREQUENCY: CONTINUOUS

## 2017-10-05 ASSESSMENT — PAIN DESCRIPTION - ORIENTATION: ORIENTATION: LOWER

## 2017-10-05 ASSESSMENT — PAIN DESCRIPTION - LOCATION: LOCATION: BACK;LEG

## 2017-10-05 NOTE — PROGRESS NOTES
Negative for discharge. Cardiovascular: Negative for syncope. Respiratory: Negative for hemoptysis and sputum production. Endocrine: Negative for polyphagia. Skin: Negative for suspicious lesions and unusual hair distribution. Musculoskeletal: Positive for back pain. Gastrointestinal: Negative for bowel incontinence and jaundice. Genitourinary: Negative for bladder incontinence and urgency. Neurological: Positive for numbness and tingling. Negative for brief paralysis and tremors. 10 point review of system was performed and negative as pertinent to chief complaint, except as stated in HPI. Physical Exam:  /84  Pulse 80  Temp 98.2 °F (36.8 °C) (Oral)   Resp 18    Physical Exam   Constitutional: She is oriented to person, place, and time and well-developed, well-nourished, and in no distress. HENT:   Right Ear: External ear normal.   Left Ear: External ear normal.   Eyes: Conjunctivae are normal. Right eye exhibits no discharge. Left eye exhibits no discharge. Neck: No tracheal deviation present. No thyromegaly present. Pulmonary/Chest: Effort normal. No stridor. No respiratory distress. Musculoskeletal:        Lumbar back: She exhibits tenderness. She exhibits no edema and no deformity. Neurological: She is alert and oriented to person, place, and time. She has normal strength. She displays no weakness. Gait normal.   Skin: Skin is warm and dry. She is not diaphoretic. Psychiatric: Mood and memory normal.   Nursing note and vitals reviewed. Record/Diagnostics Review:    As above, I did review the imaging    Assessment:  Lumbar radiculopathy  Low back pain  Lumbar disc displacement      Treatment Plan:  DISCUSSION: Treatment options discussed with patient and all questions answered to patient's satisfaction. OARRS Review: Reviewed and acceptable for medications prescribed. TREATMENT OPTIONS:     Continue Neurontin 600 mg 3 times a day.   Discussed supports

## 2017-11-03 ENCOUNTER — HOSPITAL ENCOUNTER (OUTPATIENT)
Dept: PAIN MANAGEMENT | Age: 57
Discharge: HOME | End: 2017-11-03
Payer: MEDICAID

## 2017-11-03 VITALS
DIASTOLIC BLOOD PRESSURE: 84 MMHG | OXYGEN SATURATION: 94 % | WEIGHT: 105 LBS | HEIGHT: 62 IN | TEMPERATURE: 98.2 F | BODY MASS INDEX: 19.32 KG/M2 | HEART RATE: 88 BPM | SYSTOLIC BLOOD PRESSURE: 121 MMHG | RESPIRATION RATE: 18 BRPM

## 2017-11-03 DIAGNOSIS — G89.29 CHRONIC BACK PAIN, UNSPECIFIED BACK LOCATION, UNSPECIFIED BACK PAIN LATERALITY: ICD-10-CM

## 2017-11-03 DIAGNOSIS — M54.9 CHRONIC BACK PAIN, UNSPECIFIED BACK LOCATION, UNSPECIFIED BACK PAIN LATERALITY: ICD-10-CM

## 2017-11-03 PROCEDURE — 6360000004 HC RX CONTRAST MEDICATION

## 2017-11-03 PROCEDURE — 6360000002 HC RX W HCPCS: Performed by: PAIN MEDICINE

## 2017-11-03 PROCEDURE — 62323 NJX INTERLAMINAR LMBR/SAC: CPT | Performed by: PAIN MEDICINE

## 2017-11-03 PROCEDURE — 2580000003 HC RX 258: Performed by: PAIN MEDICINE

## 2017-11-03 PROCEDURE — 6360000002 HC RX W HCPCS

## 2017-11-03 PROCEDURE — 62323 NJX INTERLAMINAR LMBR/SAC: CPT

## 2017-11-03 RX ORDER — SODIUM CHLORIDE, SODIUM LACTATE, POTASSIUM CHLORIDE, CALCIUM CHLORIDE 600; 310; 30; 20 MG/100ML; MG/100ML; MG/100ML; MG/100ML
INJECTION, SOLUTION INTRAVENOUS CONTINUOUS
Status: DISCONTINUED | OUTPATIENT
Start: 2017-11-03 | End: 2017-11-04 | Stop reason: HOSPADM

## 2017-11-03 RX ORDER — MIDAZOLAM HYDROCHLORIDE 1 MG/ML
2 INJECTION INTRAMUSCULAR; INTRAVENOUS ONCE
Status: COMPLETED | OUTPATIENT
Start: 2017-11-03 | End: 2017-11-03

## 2017-11-03 RX ORDER — FENTANYL CITRATE 50 UG/ML
100 INJECTION, SOLUTION INTRAMUSCULAR; INTRAVENOUS ONCE
Status: DISCONTINUED | OUTPATIENT
Start: 2017-11-03 | End: 2017-11-04 | Stop reason: HOSPADM

## 2017-11-03 RX ADMIN — SODIUM CHLORIDE, POTASSIUM CHLORIDE, SODIUM LACTATE AND CALCIUM CHLORIDE: 600; 310; 30; 20 INJECTION, SOLUTION INTRAVENOUS at 09:54

## 2017-11-03 RX ADMIN — MIDAZOLAM HYDROCHLORIDE 2 MG: 1 INJECTION, SOLUTION INTRAMUSCULAR; INTRAVENOUS at 09:58

## 2017-11-03 ASSESSMENT — PAIN DESCRIPTION - DESCRIPTORS: DESCRIPTORS: CONSTANT;ACHING;BURNING;SHARP

## 2017-11-03 ASSESSMENT — PAIN - FUNCTIONAL ASSESSMENT: PAIN_FUNCTIONAL_ASSESSMENT: 0-10

## 2017-11-03 NOTE — H&P
Pain Pre-Op H&P Note    Velma Torres    HPI: Amira Olivas is a 62 y.o. female that presents with low back and leg pain. Here for epidural steroid injection    Past Medical History:   Diagnosis Date    Anemia     Anxiety     Arthritis     [facility]) [facility]     G tube feedings ([facility] [address] 2/2016    every 3-4 hours    History of blood transfusion 1990    x 6 during child birth    Hypoglycemia     Insomnia     Seizures (HCC)     Squamous cell carcinoma of vocal cord ([facility] [address] 11/17/2015    with history of radiation treatments       Past Surgical History:   Procedure Laterality Date    BRONCHOSCOPY  11/5/15    COLONOSCOPY  10/04/2017    DENTAL SURGERY Bilateral     LARYNGOSCOPY Right 11/5/15    [facility] COLONOSCOPY W/BIOPSY SINGLE/MULTIPLE  10/4/2017    COLONOSCOPY WITH BIOPSY performed by Roge Harris MD at [place] [address]  11/5/15    VOCAL CORD AUGMENTATION W/RADIESSE INJ Bilateral        No Known Allergies      Current Outpatient Prescriptions:     HYDROcodone-acetaminophen (NORCO) 5-325 MG per tablet, Take 1 tablet by mouth every 12 hours as needed for Pain ., Disp: , Rfl:     phenytoin (DILANTIN) 100 MG ER capsule, TAKE TWO CAPSULES BY MOUTH IN THE MORNING AND THREE CAPSULES AT NIGHT, Disp: 150 capsule, Rfl: 2    venlafaxine (EFFEXOR XR) 75 MG extended release capsule, Take 2 capsules by mouth daily, Disp: 60 capsule, Rfl: 3    gabapentin (NEURONTIN) 300 MG capsule, Take 600 mg by mouth 3 times daily, Disp: , Rfl:     Social History   Substance Use Topics    Smoking status: Former Smoker     Packs/day: 1.50     Years: 25.00     Types: Cigarettes     Start date: 1/1/1991     Quit date: 9/29/2016    Smokeless tobacco: Never Used    Alcohol use No       Review of Systems:   Focused review of systems was performed, and negative as pertinent to diagnosis, except as stated in HPI.     Physical Exam:     Physical Exam   Constitutional: She is oriented to person, place, and time and well-developed, well-nourished, and in no distress. Musculoskeletal:        Lumbar back: She exhibits tenderness. She exhibits no swelling and no edema. Neurological: She is alert and oriented to person, place, and time. She has normal strength. She displays no weakness. Gait normal.   Nursing note and vitals reviewed. Patient's current physical status, medications, medical history, and HPI have been reviewed and updated as appropriate on this date: 11/03/17    Risk/Benefit(s): The risks, benefits, alternatives, and potential complications have been discussed with the patient/family and informed consent has been obtained for the procedure/sedation.     Diagnosis:   Low back pain  Lumbar radiculopathy      Plan: Caudal epidural steroid injection        [address]

## 2017-11-10 ENCOUNTER — HOSPITAL ENCOUNTER (OUTPATIENT)
Dept: PAIN MANAGEMENT | Age: 57
Discharge: HOME | End: 2017-11-10
Payer: MEDICAID

## 2017-11-10 VITALS
DIASTOLIC BLOOD PRESSURE: 83 MMHG | OXYGEN SATURATION: 96 % | HEIGHT: 62 IN | TEMPERATURE: 98.1 F | WEIGHT: 105 LBS | HEART RATE: 81 BPM | SYSTOLIC BLOOD PRESSURE: 130 MMHG | BODY MASS INDEX: 19.32 KG/M2 | RESPIRATION RATE: 14 BRPM

## 2017-11-10 DIAGNOSIS — M54.9 CHRONIC BACK PAIN, UNSPECIFIED BACK LOCATION, UNSPECIFIED BACK PAIN LATERALITY: ICD-10-CM

## 2017-11-10 DIAGNOSIS — G89.29 CHRONIC BACK PAIN, UNSPECIFIED BACK LOCATION, UNSPECIFIED BACK PAIN LATERALITY: ICD-10-CM

## 2017-11-10 PROCEDURE — 6360000002 HC RX W HCPCS: Performed by: PAIN MEDICINE

## 2017-11-10 PROCEDURE — 6360000002 HC RX W HCPCS

## 2017-11-10 PROCEDURE — 62323 NJX INTERLAMINAR LMBR/SAC: CPT

## 2017-11-10 PROCEDURE — 6360000004 HC RX CONTRAST MEDICATION

## 2017-11-10 PROCEDURE — 62323 NJX INTERLAMINAR LMBR/SAC: CPT | Performed by: PAIN MEDICINE

## 2017-11-10 PROCEDURE — 2580000003 HC RX 258: Performed by: PAIN MEDICINE

## 2017-11-10 RX ORDER — SODIUM CHLORIDE, SODIUM LACTATE, POTASSIUM CHLORIDE, CALCIUM CHLORIDE 600; 310; 30; 20 MG/100ML; MG/100ML; MG/100ML; MG/100ML
INJECTION, SOLUTION INTRAVENOUS CONTINUOUS
Status: DISCONTINUED | OUTPATIENT
Start: 2017-11-10 | End: 2017-11-11 | Stop reason: HOSPADM

## 2017-11-10 RX ORDER — MIDAZOLAM HYDROCHLORIDE 1 MG/ML
2 INJECTION INTRAMUSCULAR; INTRAVENOUS ONCE
Status: COMPLETED | OUTPATIENT
Start: 2017-11-10 | End: 2017-11-10

## 2017-11-10 RX ORDER — GABAPENTIN 300 MG/1
600 CAPSULE ORAL 3 TIMES DAILY
Qty: 180 CAPSULE | Refills: 0 | Status: SHIPPED | OUTPATIENT
Start: 2017-11-10 | End: 2017-12-13 | Stop reason: DRUGHIGH

## 2017-11-10 RX ORDER — FENTANYL CITRATE 50 UG/ML
100 INJECTION, SOLUTION INTRAMUSCULAR; INTRAVENOUS ONCE
Status: COMPLETED | OUTPATIENT
Start: 2017-11-10 | End: 2017-11-10

## 2017-11-10 RX ADMIN — SODIUM CHLORIDE, POTASSIUM CHLORIDE, SODIUM LACTATE AND CALCIUM CHLORIDE: 600; 310; 30; 20 INJECTION, SOLUTION INTRAVENOUS at 10:29

## 2017-11-10 RX ADMIN — FENTANYL CITRATE 50 MCG: 50 INJECTION INTRAMUSCULAR; INTRAVENOUS at 10:37

## 2017-11-10 RX ADMIN — MIDAZOLAM HYDROCHLORIDE 1 MG: 1 INJECTION, SOLUTION INTRAMUSCULAR; INTRAVENOUS at 10:37

## 2017-11-10 ASSESSMENT — PAIN - FUNCTIONAL ASSESSMENT
PAIN_FUNCTIONAL_ASSESSMENT: 0-10

## 2017-11-10 NOTE — OP NOTE
Caudal Epidural Steroid Injection:   SURGEON: Pete Torres    PRE-OP DIAGNOSIS: Low back pain  Lumbar radiculopathy    POST-OP DIAGNOSIS: same. PROCEDURE PERFORMED: Caudal Epidural Steroid Injection. Physician confirmed and marked the surgical site. ASA Status: 3       Mallampati Class: 2    Pain Rated as Moderate    CONSENT: Patient has undergone the educational process with this procedure, is aware and fully understands the risks involved: potential damage to any and all body organs including possible bleeding, infection and nerve injury, allergic reaction and headache. Patient also understands that the procedure will be undertaken in a safe, controlled, and monitored setting. Patient recognizes that the benefits may include relief from pain and reduction in the oral use of medications. Patient agreed to proceed. PREP:  Timeout was performed prior to starting the procedure. The patient was prepped with chloraprep and draped sterilely. 5ml of 0.5% lidocaine was used to anesthetize the skin and subcutaneous tissue. PROCEDURE NOTE: A 22 gauge 3.5 inch spinal needle was advanced under fluoroscopic guidance to the sacrococcygeal membrane and into the caudal epidural space. 1 ml of (omnipaque) contrast was then injected and spread along the epidural space. Aspiration was negative for blood, CSF and producing pain. 10mg Dexamethasone mixed with 10ml of 0.5% Lidocaine was then injected. The needle was withdrawn by the physician and the nurse applied a sterile dressing. The patient tolerated the procedure well. No complications occurred. Patient transferred to the recovery room in satisfactory condition. Appropriate written discharge instructions given to the patient.     [address] [place] [address]

## 2017-11-20 PROBLEM — K63.5 COLON POLYP: Status: ACTIVE | Noted: 2017-10-04

## 2017-11-22 ENCOUNTER — OFFICE VISIT (OUTPATIENT)
Dept: GASTROENTEROLOGY | Age: 57
End: 2017-11-22
Payer: MEDICAID

## 2017-11-22 VITALS
HEART RATE: 90 BPM | DIASTOLIC BLOOD PRESSURE: 74 MMHG | BODY MASS INDEX: 19.03 KG/M2 | HEIGHT: 62 IN | SYSTOLIC BLOOD PRESSURE: 113 MMHG | RESPIRATION RATE: 14 BRPM | OXYGEN SATURATION: 100 % | WEIGHT: 103.4 LBS

## 2017-11-22 DIAGNOSIS — K63.5 POLYP OF COLON, UNSPECIFIED PART OF COLON, UNSPECIFIED TYPE: Primary | ICD-10-CM

## 2017-11-22 PROCEDURE — 1036F TOBACCO NON-USER: CPT | Performed by: INTERNAL MEDICINE

## 2017-11-22 PROCEDURE — 99214 OFFICE O/P EST MOD 30 MIN: CPT | Performed by: INTERNAL MEDICINE

## 2017-11-22 PROCEDURE — G8427 DOCREV CUR MEDS BY ELIG CLIN: HCPCS | Performed by: INTERNAL MEDICINE

## 2017-11-22 PROCEDURE — 3014F SCREEN MAMMO DOC REV: CPT | Performed by: INTERNAL MEDICINE

## 2017-11-22 PROCEDURE — G8484 FLU IMMUNIZE NO ADMIN: HCPCS | Performed by: INTERNAL MEDICINE

## 2017-11-22 PROCEDURE — G8420 CALC BMI NORM PARAMETERS: HCPCS | Performed by: INTERNAL MEDICINE

## 2017-11-22 PROCEDURE — 3017F COLORECTAL CA SCREEN DOC REV: CPT | Performed by: INTERNAL MEDICINE

## 2017-11-22 ASSESSMENT — ENCOUNTER SYMPTOMS
GASTROINTESTINAL NEGATIVE: 1
DIARRHEA: 0
CONSTIPATION: 0
ALLERGIC/IMMUNOLOGIC NEGATIVE: 1
BACK PAIN: 1
ANAL BLEEDING: 0
SORE THROAT: 1
VOICE CHANGE: 1
ABDOMINAL DISTENTION: 0
NAUSEA: 0
RECTAL PAIN: 0
RESPIRATORY NEGATIVE: 1
BLOOD IN STOOL: 0
ABDOMINAL PAIN: 0
EYES NEGATIVE: 1
VOMITING: 0
TROUBLE SWALLOWING: 0

## 2017-11-22 NOTE — PROGRESS NOTES
normal. Pupils are equal, round, and reactive to light. Neck: Normal range of motion. Neck supple. Cardiovascular: Normal heart sounds and intact distal pulses. Pulmonary/Chest: Effort normal. She has rales. Abdominal: Soft. Bowel sounds are normal.   NON TENDER, NON DISTENTED  LIVER SPLEEN AND HERNIAS ARE NOT  PALPABLE  BOWEL SOUNDS ARE POSITIVE      Musculoskeletal: Normal range of motion. Neurological: She is alert and oriented to person, place, and time. Skin: Skin is warm. Psychiatric: Her behavior is normal.       Assessment:      As above      Plan:      Repeat Colon one year with better prep      Pt was advised in detail about some life style and dietary modifications. She was advised about avoidance of caffeine, nicotine and chocolate. Pt was also told to stay away from any kind of fast foods, soda pops. She was also advised to avoid lots of spices, grease and fried food etc.     Instructions were also given about trying to arrange the timing, quality and quantity of food. Instructions were given about using ample amount of fiber including dietary and supplemental fiber either metamucil, bennafiber or citrucell etc.  Pt was advised about drinking ample amount of water without any colors or chemicals. Stress was given about regular exercise. Pt has verbalized understanding and agreement to these modifications.       The patient was instructed to start taking some OTC Probiotics products   These are available over the counter at the Pharmacy stores and Grocery stores  He was explained about the beneficial effects they have in the GI track  They will help to establish the good bacterial tanner and will help with the digestion and bowel movements  The patient has verbalized understanding and agreement to this plan

## 2017-12-07 ENCOUNTER — HOSPITAL ENCOUNTER (OUTPATIENT)
Dept: PAIN MANAGEMENT | Age: 57
Discharge: HOME | End: 2017-12-07
Payer: MEDICAID

## 2017-12-07 VITALS
HEART RATE: 116 BPM | DIASTOLIC BLOOD PRESSURE: 91 MMHG | RESPIRATION RATE: 16 BRPM | TEMPERATURE: 98.1 F | SYSTOLIC BLOOD PRESSURE: 139 MMHG

## 2017-12-07 PROCEDURE — 99214 OFFICE O/P EST MOD 30 MIN: CPT

## 2017-12-07 PROCEDURE — 99213 OFFICE O/P EST LOW 20 MIN: CPT | Performed by: PAIN MEDICINE

## 2017-12-07 ASSESSMENT — ENCOUNTER SYMPTOMS
EYE DISCHARGE: 0
BACK PAIN: 1
SUSPICIOUS LESIONS: 0
HEMOPTYSIS: 0

## 2017-12-07 ASSESSMENT — PAIN SCALES - GENERAL: PAINLEVEL_OUTOF10: 5

## 2017-12-07 NOTE — PROGRESS NOTES
HPI:     Back Pain   This is a chronic problem. The current episode started more than 1 year ago. The problem occurs constantly. The problem is unchanged. The pain is present in the gluteal. The quality of the pain is described as aching, cramping, shooting and stabbing. The pain radiates to the right thigh, left foot and left knee. The pain is at a severity of 5/10. The pain is moderate. The pain is the same all the time. The symptoms are aggravated by position, standing, lying down and sitting. Stiffness is present in the morning. She has tried heat, ice and NSAIDs for the symptoms. The treatment provided no relief. Had caudal epidural steroid injection ×2 with mild benefit. MRI of her lumbar spine with L3 4 L4 5 and L5-S1 disc bulging with degenerative changes L4 5 and L5-S1. She is seen a surgeon who suggested more conservative measures prior to considering surgery however she feels the pain is been getting quite severe. She is on gabapentin 600 mg 3 times a day with mild benefit. Patient denies any new neurological symptoms. No bowel or bladder incontinence, no weakness, and no falling. Review of OARRS does not show any aberrant prescription behavior.  .    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

## 2017-12-15 DIAGNOSIS — G40.309 GENERALIZED CONVULSIVE EPILEPSY WITHOUT INTRACTABLE EPILEPSY (HCC): ICD-10-CM

## 2017-12-15 RX ORDER — PHENYTOIN SODIUM 100 MG/1
CAPSULE, EXTENDED RELEASE ORAL
Qty: 150 CAPSULE | Refills: 2 | Status: SHIPPED | OUTPATIENT
Start: 2017-12-15 | End: 2018-03-21 | Stop reason: SDUPTHER

## 2017-12-22 ENCOUNTER — HOSPITAL ENCOUNTER (OUTPATIENT)
Dept: PAIN MANAGEMENT | Age: 57
Discharge: HOME | End: 2017-12-22
Payer: MEDICAID

## 2017-12-22 VITALS
RESPIRATION RATE: 14 BRPM | SYSTOLIC BLOOD PRESSURE: 140 MMHG | OXYGEN SATURATION: 98 % | DIASTOLIC BLOOD PRESSURE: 80 MMHG | TEMPERATURE: 98.2 F | HEART RATE: 85 BPM

## 2017-12-22 PROCEDURE — 6360000002 HC RX W HCPCS

## 2017-12-22 PROCEDURE — 64483 NJX AA&/STRD TFRM EPI L/S 1: CPT

## 2017-12-22 PROCEDURE — 64483 NJX AA&/STRD TFRM EPI L/S 1: CPT | Performed by: PAIN MEDICINE

## 2017-12-22 PROCEDURE — 6360000004 HC RX CONTRAST MEDICATION

## 2017-12-22 PROCEDURE — 64484 NJX AA&/STRD TFRM EPI L/S EA: CPT

## 2017-12-22 PROCEDURE — 6360000002 HC RX W HCPCS: Performed by: PAIN MEDICINE

## 2017-12-22 PROCEDURE — 2580000003 HC RX 258: Performed by: PAIN MEDICINE

## 2017-12-22 PROCEDURE — 64484 NJX AA&/STRD TFRM EPI L/S EA: CPT | Performed by: PAIN MEDICINE

## 2017-12-22 RX ORDER — SODIUM CHLORIDE, SODIUM LACTATE, POTASSIUM CHLORIDE, CALCIUM CHLORIDE 600; 310; 30; 20 MG/100ML; MG/100ML; MG/100ML; MG/100ML
INJECTION, SOLUTION INTRAVENOUS CONTINUOUS
Status: DISCONTINUED | OUTPATIENT
Start: 2017-12-22 | End: 2017-12-23 | Stop reason: HOSPADM

## 2017-12-22 RX ORDER — MIDAZOLAM HYDROCHLORIDE 1 MG/ML
2 INJECTION INTRAMUSCULAR; INTRAVENOUS ONCE
Status: COMPLETED | OUTPATIENT
Start: 2017-12-22 | End: 2017-12-22

## 2017-12-22 RX ORDER — FENTANYL CITRATE 50 UG/ML
100 INJECTION, SOLUTION INTRAMUSCULAR; INTRAVENOUS ONCE
Status: COMPLETED | OUTPATIENT
Start: 2017-12-22 | End: 2017-12-22

## 2017-12-22 RX ADMIN — MIDAZOLAM HYDROCHLORIDE 1 MG: 1 INJECTION, SOLUTION INTRAMUSCULAR; INTRAVENOUS at 07:32

## 2017-12-22 RX ADMIN — FENTANYL CITRATE 50 MCG: 50 INJECTION, SOLUTION INTRAMUSCULAR; INTRAVENOUS at 07:32

## 2017-12-22 RX ADMIN — SODIUM CHLORIDE, POTASSIUM CHLORIDE, SODIUM LACTATE AND CALCIUM CHLORIDE 500 ML: 600; 310; 30; 20 INJECTION, SOLUTION INTRAVENOUS at 07:18

## 2017-12-22 ASSESSMENT — PAIN - FUNCTIONAL ASSESSMENT
PAIN_FUNCTIONAL_ASSESSMENT: 0-10
PAIN_FUNCTIONAL_ASSESSMENT: 0-10

## 2017-12-22 NOTE — OP NOTE
Transforaminal Epidural Steroid Injection:  SURGEON: Pete Torres    PRE-OP DIAGNOSIS: M54.17 (lumbosacral neuritis), M54.5 (low back pain)    POST-OP DIAGNOSIS: Same. PROCEDURE PERFORMED: Left L4 and L5  Lumbar Transforaminal GAGANDEEP selective nerve root block. Physician confirmed and marked the surgical site. ASA: 3                        MP: 2    CONSENT: Patient has undergone the educational process with this procedure, is aware and fully understands the risks involved: potential damage to any and all body organs including possible bleeding, infection, and nerve injury, allergic reaction and headache. Patient also understands that the procedure will be undertaken in a safe, controlled and monitored setting. Patient recognizes that the benefits may include relief from pain and reduction in the oral use of medications. Patient agreed to proceed. PREP: The patient was placed in the prone position and padded appropriately. The injection site was prepped with chloroprep and draped appropriately. 5ml of 0.5% lidocaine was used to anesthetize the skin and subcutaneous tissue. PROCEDURE NOTE: A 22 gauge 3.5 inch Pencil-Point needle was then advanced to the Left L4 and L5 neuroforamen using a wide paramedian approach under fluoroscopic guidance. Aspiration was negative for blood, CSF and producing pain. Contrast Medium: 1 ml of (omnipaque) contrast was then injected and the following was noted: appropriate spread of contrast along the nerve root sheath and adjacent epidural space 4mg Dexamethasone mixed with 1.5 ml of 0.5 % lidocaine into the nerve root sheath of each of the indicated levels. The needle was withdrawn by the physician and the nurse applied a sterile dressing. The patient tolerated the procedure well. No complications occured. Patient transferred to the recovery room in satisfatory condition. Appropriate written discharge instructions given to the patient.     [address] [place] [address]

## 2017-12-22 NOTE — H&P
Pain Pre-Op H&P Note    Live Torres    HPI: Shanta Barlow is a 62 y.o. female that presents with low back and left leg pain, here for left-sided transforaminal epidural steroid injection.     Past Medical History:   Diagnosis Date    Anemia     Anxiety     Arthritis     Colon polyp 10/04/2017    multi adenomas    [facility]     G tube feedings ([facility] 2/2016    every 3-4 hours    History of blood transfusion 1990    x 6 during child birth    Hypoglycemia     Insomnia     Seizures ([facility]     Squamous cell carcinoma of vocal cord ([facility] 11/17/2015    with history of radiation treatments       Past Surgical History:   Procedure Laterality Date    BRONCHOSCOPY  11/5/15    COLONOSCOPY  10/04/2017    multi polyps; poor prep    DENTAL SURGERY Bilateral     LARYNGOSCOPY Right 11/5/15    NERVE BLOCK  11/03/2017    caudal #1, decadron 10 mg    NERVE BLOCK  11/10/2017    caudal #2 decadron 10mg    SC COLONOSCOPY W/BIOPSY SINGLE/MULTIPLE  10/4/2017    COLONOSCOPY WITH BIOPSY performed by Bouchra Harry MD at [address] [place] [address]  11/5/15    VOCAL CORD AUGMENTATION W/RADIESSE INJ Bilateral        No Known Allergies      Current Outpatient Prescriptions:     gabapentin (NEURONTIN) 600 MG tablet, Take 1 tablet by mouth 3 times daily, Disp: 90 tablet, Rfl: 0    phenytoin (DILANTIN) 100 MG ER capsule, TAKE TWO CAPSULES BY MOUTH IN THE MORNING AND THREE CAPSULES AT NIGHT, Disp: 150 capsule, Rfl: 2    venlafaxine (EFFEXOR XR) 75 MG extended release capsule, Take 2 capsules by mouth daily, Disp: 60 capsule, Rfl: 3    Social History   Substance Use Topics    Smoking status: Former Smoker     Packs/day: 1.50     Years: 25.00     Types: Cigarettes     Start date: 1/1/1991     Quit date: 9/29/2016    Smokeless tobacco: Never Used    Alcohol use No       Review of Systems:   Focused review of systems was performed, and negative as pertinent to diagnosis, except as stated in HPI. Physical Exam:     Physical Exam   Musculoskeletal:        Lumbar back: She exhibits tenderness. She exhibits no edema and no deformity. Vitals reviewed. Patient's current physical status, medications, medical history, and HPI have been reviewed and updated as appropriate on this date: 12/22/17    Risk/Benefit(s): The risks, benefits, alternatives, and potential complications have been discussed with the patient/family and informed consent has been obtained for the procedure/sedation.     Diagnosis:   Lumbar radiculopathy  Low back pain      Plan: Left L4 and L5 transforaminal epidural steroid injection        [address]

## 2018-01-17 DIAGNOSIS — F32.1 MAJOR DEPRESSIVE DISORDER, SINGLE EPISODE, MODERATE (HCC): Chronic | ICD-10-CM

## 2018-01-17 DIAGNOSIS — F41.1 GENERALIZED ANXIETY DISORDER: ICD-10-CM

## 2018-01-18 RX ORDER — VENLAFAXINE HYDROCHLORIDE 75 MG/1
150 CAPSULE, EXTENDED RELEASE ORAL DAILY
Qty: 60 CAPSULE | Refills: 0 | Status: SHIPPED | OUTPATIENT
Start: 2018-01-18 | End: 2018-02-21 | Stop reason: ALTCHOICE

## 2018-02-20 DIAGNOSIS — F41.1 GENERALIZED ANXIETY DISORDER: ICD-10-CM

## 2018-02-20 DIAGNOSIS — F32.1 MAJOR DEPRESSIVE DISORDER, SINGLE EPISODE, MODERATE (HCC): Chronic | ICD-10-CM

## 2018-02-21 RX ORDER — VENLAFAXINE HYDROCHLORIDE 75 MG/1
150 CAPSULE, EXTENDED RELEASE ORAL DAILY
Qty: 60 CAPSULE | Refills: 2 | OUTPATIENT
Start: 2018-02-21 | End: 2019-02-21

## 2018-02-21 RX ORDER — VENLAFAXINE HYDROCHLORIDE 150 MG/1
150 CAPSULE, EXTENDED RELEASE ORAL DAILY
Qty: 30 CAPSULE | Refills: 3 | Status: SHIPPED | OUTPATIENT
Start: 2018-02-21 | End: 2018-06-18 | Stop reason: SDUPTHER

## 2018-02-21 NOTE — TELEPHONE ENCOUNTER
LOV 9/20/17  LRF 1/18/18  RTO 3/21/18    Health Maintenance   Topic Date Due    HIV screen  06/01/1975    Breast cancer screen  06/01/2010    Shingles Vaccine (1 of 2 - 2 Dose Series) 06/01/2010    Smoker: low dose lung CT screening  10/22/2016    DTaP/Tdap/Td vaccine (1 - Tdap) 06/16/2018 (Originally 6/1/1979)    Flu vaccine (1) 08/01/2018 (Originally 9/1/2017)    Colon cancer screen colonoscopy  10/04/2018    Cervical cancer screen  11/23/2018    Lipid screen  04/22/2021    Hepatitis C screen  Completed             (applicable per patient's age: Cancer Screenings, Depression Screening, Fall Risk Screening, Immunizations)    LDL Cholesterol (mg/dL)   Date Value   04/22/2016 94     AST (U/L)   Date Value   04/22/2016 17     ALT (U/L)   Date Value   04/22/2016 19     BUN (mg/dL)   Date Value   04/22/2016 22 (H)      (goal A1C is < 7)   (goal LDL is <100) need 30-50% reduction from baseline     BP Readings from Last 3 Encounters:   12/22/17 (!) 140/80   12/07/17 (!) 139/91   11/22/17 113/74    (goal /80)      All Future Testing planned in [facility]:  Lab Frequency Next Occurrence   Lipid Panel Once 09/14/2017   Comprehensive Metabolic Panel Once 76/52/7602   CBC Once 09/14/2017   Phenytoin Level, Total and Free Once 08/20/2017       Next Visit Date:  Future Appointments  Date Time Provider Lillie Martinezi   3/21/2018 4:00 PM KENISHA Mclaughlin [facility]   4/17/2018 4:00 PM Linda Thomas MD Neuro Spec [facility]   11/28/2018 1:00 PM Ben Summers MD grtlk exc [facility]            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)     Seizures ([address]     Anxiety     Depression     Hypoglycemia     S/P radiation therapy within last four weeks     Major depressive disorder, single episode, moderate (HCC)     Ulnar neuropathy of right upper extremity     Carpal tunnel syndrome of right wrist     Cancer of supraglottis ([address]     Convulsions ([address]     Generalized

## 2018-03-21 ENCOUNTER — TELEPHONE (OUTPATIENT)
Dept: NEUROLOGY | Age: 58
End: 2018-03-21

## 2018-03-21 DIAGNOSIS — G40.309 GENERALIZED CONVULSIVE EPILEPSY WITHOUT INTRACTABLE EPILEPSY (HCC): ICD-10-CM

## 2018-03-23 RX ORDER — PHENYTOIN SODIUM 100 MG/1
CAPSULE, EXTENDED RELEASE ORAL
Qty: 150 CAPSULE | Refills: 0 | Status: SHIPPED | OUTPATIENT
Start: 2018-03-23 | End: 2018-04-17 | Stop reason: SDUPTHER

## 2018-04-10 ENCOUNTER — TELEPHONE (OUTPATIENT)
Dept: FAMILY MEDICINE CLINIC | Age: 58
End: 2018-04-10

## 2018-04-17 DIAGNOSIS — G40.309 GENERALIZED CONVULSIVE EPILEPSY WITHOUT INTRACTABLE EPILEPSY (HCC): ICD-10-CM

## 2018-04-17 RX ORDER — PHENYTOIN SODIUM 100 MG/1
CAPSULE, EXTENDED RELEASE ORAL
Qty: 150 CAPSULE | Refills: 1 | Status: SHIPPED | OUTPATIENT
Start: 2018-04-17 | End: 2018-06-06 | Stop reason: SDUPTHER

## 2018-06-06 ENCOUNTER — OFFICE VISIT (OUTPATIENT)
Dept: NEUROLOGY | Age: 58
End: 2018-06-06
Payer: MEDICAID

## 2018-06-06 VITALS
HEIGHT: 62 IN | BODY MASS INDEX: 19.98 KG/M2 | SYSTOLIC BLOOD PRESSURE: 138 MMHG | HEART RATE: 95 BPM | WEIGHT: 108.6 LBS | DIASTOLIC BLOOD PRESSURE: 74 MMHG

## 2018-06-06 DIAGNOSIS — G56.21 ULNAR NEUROPATHY OF RIGHT UPPER EXTREMITY: ICD-10-CM

## 2018-06-06 DIAGNOSIS — G56.01 CARPAL TUNNEL SYNDROME OF RIGHT WRIST: ICD-10-CM

## 2018-06-06 DIAGNOSIS — R56.9 SEIZURES (HCC): ICD-10-CM

## 2018-06-06 DIAGNOSIS — G40.309 GENERALIZED CONVULSIVE EPILEPSY WITHOUT INTRACTABLE EPILEPSY (HCC): Primary | ICD-10-CM

## 2018-06-06 DIAGNOSIS — G25.81 RESTLESS LEG SYNDROME: ICD-10-CM

## 2018-06-06 DIAGNOSIS — G25.0 ESSENTIAL TREMOR: ICD-10-CM

## 2018-06-06 PROCEDURE — 3017F COLORECTAL CA SCREEN DOC REV: CPT | Performed by: PSYCHIATRY & NEUROLOGY

## 2018-06-06 PROCEDURE — G8420 CALC BMI NORM PARAMETERS: HCPCS | Performed by: PSYCHIATRY & NEUROLOGY

## 2018-06-06 PROCEDURE — G8427 DOCREV CUR MEDS BY ELIG CLIN: HCPCS | Performed by: PSYCHIATRY & NEUROLOGY

## 2018-06-06 PROCEDURE — 1036F TOBACCO NON-USER: CPT | Performed by: PSYCHIATRY & NEUROLOGY

## 2018-06-06 PROCEDURE — 99214 OFFICE O/P EST MOD 30 MIN: CPT | Performed by: PSYCHIATRY & NEUROLOGY

## 2018-06-06 RX ORDER — M-VIT,TX,IRON,MINS/CALC/FOLIC 27MG-0.4MG
1 TABLET ORAL DAILY
Qty: 30 TABLET | Refills: 11 | Status: SHIPPED | OUTPATIENT
Start: 2018-06-06 | End: 2019-04-16 | Stop reason: ALTCHOICE

## 2018-06-06 RX ORDER — PHENYTOIN SODIUM 100 MG/1
CAPSULE, EXTENDED RELEASE ORAL
Qty: 150 CAPSULE | Refills: 6 | Status: SHIPPED | OUTPATIENT
Start: 2018-06-06 | End: 2019-01-28 | Stop reason: SDUPTHER

## 2018-06-18 DIAGNOSIS — F32.1 MAJOR DEPRESSIVE DISORDER, SINGLE EPISODE, MODERATE (HCC): Chronic | ICD-10-CM

## 2018-06-18 DIAGNOSIS — F41.1 GENERALIZED ANXIETY DISORDER: ICD-10-CM

## 2018-06-18 RX ORDER — VENLAFAXINE HYDROCHLORIDE 150 MG/1
150 CAPSULE, EXTENDED RELEASE ORAL DAILY
Qty: 30 CAPSULE | Refills: 5 | Status: SHIPPED | OUTPATIENT
Start: 2018-06-18 | End: 2019-01-10 | Stop reason: SDUPTHER

## 2018-07-09 ENCOUNTER — HOSPITAL ENCOUNTER (OUTPATIENT)
Dept: MRI IMAGING | Facility: CLINIC | Age: 58
Discharge: HOME | End: 2018-07-11
Payer: MEDICAID

## 2018-07-09 DIAGNOSIS — G40.309 GENERALIZED CONVULSIVE EPILEPSY WITHOUT INTRACTABLE EPILEPSY (HCC): ICD-10-CM

## 2018-08-06 ENCOUNTER — HOSPITAL ENCOUNTER (OUTPATIENT)
Dept: CT IMAGING | Age: 58
Discharge: HOME | End: 2018-08-08
Payer: MEDICAID

## 2018-08-06 ENCOUNTER — OFFICE VISIT (OUTPATIENT)
Dept: FAMILY MEDICINE CLINIC | Age: 58
End: 2018-08-06
Payer: MEDICAID

## 2018-08-06 ENCOUNTER — TELEPHONE (OUTPATIENT)
Dept: FAMILY MEDICINE CLINIC | Age: 58
End: 2018-08-06

## 2018-08-06 VITALS
SYSTOLIC BLOOD PRESSURE: 132 MMHG | WEIGHT: 110 LBS | BODY MASS INDEX: 20.12 KG/M2 | HEART RATE: 68 BPM | TEMPERATURE: 98.4 F | RESPIRATION RATE: 20 BRPM | DIASTOLIC BLOOD PRESSURE: 78 MMHG

## 2018-08-06 DIAGNOSIS — H57.12 EYE PAIN, LEFT: ICD-10-CM

## 2018-08-06 DIAGNOSIS — J01.90 ACUTE BACTERIAL SINUSITIS: ICD-10-CM

## 2018-08-06 DIAGNOSIS — H00.036 EYELID CELLULITIS, LEFT: ICD-10-CM

## 2018-08-06 DIAGNOSIS — L02.01 CUTANEOUS ABSCESS OF FACE: Primary | ICD-10-CM

## 2018-08-06 DIAGNOSIS — H00.036 EYELID CELLULITIS, LEFT: Primary | ICD-10-CM

## 2018-08-06 DIAGNOSIS — R51.9 SINUS HEADACHE: ICD-10-CM

## 2018-08-06 DIAGNOSIS — B96.89 ACUTE BACTERIAL SINUSITIS: ICD-10-CM

## 2018-08-06 DIAGNOSIS — H02.846 SWELLING OF EYELID, LEFT: ICD-10-CM

## 2018-08-06 LAB
CREAT SERPL-MCNC: 0.71 MG/DL (ref 0.5–0.9)
GFR AFRICAN AMERICAN: >60 ML/MIN
GFR NON-AFRICAN AMERICAN: >60 ML/MIN
GFR SERPL CREATININE-BSD FRML MDRD: NORMAL ML/MIN/{1.73_M2}
GFR SERPL CREATININE-BSD FRML MDRD: NORMAL ML/MIN/{1.73_M2}

## 2018-08-06 PROCEDURE — 82565 ASSAY OF CREATININE: CPT

## 2018-08-06 PROCEDURE — G8420 CALC BMI NORM PARAMETERS: HCPCS | Performed by: NURSE PRACTITIONER

## 2018-08-06 PROCEDURE — 36415 COLL VENOUS BLD VENIPUNCTURE: CPT

## 2018-08-06 PROCEDURE — 70481 CT ORBIT/EAR/FOSSA W/DYE: CPT

## 2018-08-06 PROCEDURE — 6360000004 HC RX CONTRAST MEDICATION: Performed by: NURSE PRACTITIONER

## 2018-08-06 PROCEDURE — G8427 DOCREV CUR MEDS BY ELIG CLIN: HCPCS | Performed by: NURSE PRACTITIONER

## 2018-08-06 PROCEDURE — 2580000003 HC RX 258: Performed by: NURSE PRACTITIONER

## 2018-08-06 PROCEDURE — 3017F COLORECTAL CA SCREEN DOC REV: CPT | Performed by: NURSE PRACTITIONER

## 2018-08-06 PROCEDURE — 1036F TOBACCO NON-USER: CPT | Performed by: NURSE PRACTITIONER

## 2018-08-06 PROCEDURE — 99213 OFFICE O/P EST LOW 20 MIN: CPT | Performed by: NURSE PRACTITIONER

## 2018-08-06 RX ORDER — AMOXICILLIN AND CLAVULANATE POTASSIUM 875; 125 MG/1; MG/1
1 TABLET, FILM COATED ORAL 2 TIMES DAILY
Qty: 20 TABLET | Refills: 0 | OUTPATIENT
Start: 2018-08-06 | End: 2018-08-16

## 2018-08-06 RX ORDER — 0.9 % SODIUM CHLORIDE 0.9 %
60 INTRAVENOUS SOLUTION INTRAVENOUS ONCE
Status: COMPLETED | OUTPATIENT
Start: 2018-08-06 | End: 2018-08-06

## 2018-08-06 RX ORDER — SODIUM CHLORIDE 0.9 % (FLUSH) 0.9 %
10 SYRINGE (ML) INJECTION PRN
Status: DISCONTINUED | OUTPATIENT
Start: 2018-08-06 | End: 2018-08-09 | Stop reason: HOSPADM

## 2018-08-06 RX ADMIN — SODIUM CHLORIDE 60 ML: 9 INJECTION, SOLUTION INTRAVENOUS at 16:27

## 2018-08-06 RX ADMIN — IOPAMIDOL 75 ML: 755 INJECTION, SOLUTION INTRAVENOUS at 16:28

## 2018-08-06 RX ADMIN — Medication 10 ML: at 16:28

## 2018-08-06 ASSESSMENT — ENCOUNTER SYMPTOMS
RHINORRHEA: 1
SHORTNESS OF BREATH: 0
NAUSEA: 0
CONSTIPATION: 0
SINUS PRESSURE: 1
COUGH: 1
VOMITING: 0
DIARRHEA: 1
ABDOMINAL PAIN: 0
EYE DISCHARGE: 1
SORE THROAT: 0
VOICE CHANGE: 1
EYE ITCHING: 1

## 2018-08-06 NOTE — PATIENT INSTRUCTIONS
area as soon as you can. This can help prevent an infection. ¨ Wash the area with cool water and a mild soap, such as [place]. ¨ Do not use rubbing alcohol, hydrogen peroxide, iodine, or Mercurochrome. These can harm the tissues and slow healing. · Call your doctor if you have a sinus infection with redness or swelling of your eyes. When should you call for help? Call your doctor now or seek immediate medical care if:    · You have new or worse signs of an eye infection, such as:  ¨ Pus or thick discharge coming from the eye. ¨ Redness or swelling around the eye. ¨ A fever.     · Your eye seems to be bulging out.     · You seem to be getting sicker.     · You have vision changes.    Watch closely for changes in your health, and be sure to contact your doctor if:    · You do not get better as expected. Where can you learn more? Go to https://Codefiedpezulayeb.Swiftype. org and sign in to your LawPivot account. Enter [phone] [id number] [phone] in the Gigle Networks box to learn more about \"Cellulitis of the Eye: Care Instructions. \"     If you do not have an account, please click on the \"Sign Up Now\" link. Current as of: December 3, 2017  Content Version: 11.6  © 8246-4490 [employer]. Care instructions adapted under license by [facility] ([facility]). If you have questions about a medical condition or this instruction, always ask your healthcare professional. [place] [address] any warranty or liability for your use of this information. Patient Education        Head or Face Pain: Care Instructions  Your Care Instructions    Common causes of head or face pain are allergies, stress, and injuries. Other causes include tooth problems and sinus infections. Eating certain foods, such as chocolate or cheese, or drinking certain liquids, such as coffee or cola, can cause head pain for some people. If you have mild head pain, you may not need treatment.  It is important to watch your symptoms and talk to better as expected. Where can you learn more? Go to https://chpepiceweb.IGAWorks. org and sign in to your Makeblock account. Enter P568 in the Newtron box to learn more about \"Head or Face Pain: Care Instructions. \"     If you do not have an account, please click on the \"Sign Up Now\" link. Current as of: November 20, 2017  Content Version: 11.6  © 9122-4197 [employer]. Care instructions adapted under license by [facility] ([facility]). If you have questions about a medical condition or this instruction, always ask your healthcare professional. [address] any warranty or liability for your use of this information.

## 2018-08-06 NOTE — PROGRESS NOTES
30 capsule 5    phenytoin (DILANTIN) 100 MG ER capsule TAKE TWO CAPSULES BY MOUTH EVERY MORNING AND TAKE THREE CAPSULES BY MOUTH EVERY NIGHT 150 capsule 6    Multiple Vitamins-Minerals (THERAPEUTIC MULTIVITAMIN-MINERALS) tablet Take 1 tablet by mouth daily 30 tablet 11    calcium carbonate-vitamin D (CALTRATE 600+D) 600-400 MG-UNIT TABS per tab Take 1 tablet by mouth daily 30 tablet 12     No current facility-administered medications for this visit. HPI   Last few days trouble hearing in left ear. Started yesterday with sinus headache - has not had headache for last 2 years. Has had URI symptoms for last few weeks. Woke up this morning with left upper eye lid swelling and tender. Unable to open eye. She has pain with eye movement. States has been continuously draining clear fluid. [email or web address]     Review of Systems   Constitutional: Positive for fatigue. Negative for activity change, appetite change and fever. HENT: Positive for congestion, ear pain (left), hearing loss (left), postnasal drip, rhinorrhea, sinus pressure and voice change (chronically hoarse - worse in last few weeks). Negative for sore throat. Eyes: Positive for discharge (watering today), itching (left eye) and visual disturbance. Left eyelid swelling, tender, and erythematous   Respiratory: Positive for cough (a little). Negative for shortness of breath. Cardiovascular: Positive for palpitations (intermittently chronically). Negative for chest pain and leg swelling. Gastrointestinal: Positive for diarrhea (couple of days ago). Negative for abdominal pain, constipation, nausea and vomiting. Genitourinary: Negative. Skin: Negative for rash and wound. Neurological: Negative for dizziness, syncope and numbness. Psychiatric/Behavioral: Positive for sleep disturbance (chronic troubles).        Objective:     /78 (Site: Right Arm, Position: Sitting, Cuff Size: Medium Adult)   Pulse 68   Temp 98.4 °F (36.9 °C) (Tympanic) Resp 20   Wt 110 lb (49.9 kg)   BMI 20.12 kg/m²      Physical Exam   Constitutional: She is oriented to person, place, and time. Vital signs are normal. She appears well-developed and well-nourished. She is cooperative. No distress. Voice is chronically hoarse status post radiation to neck after her vocal cord cancer 2 years ago   HENT:   Head: Atraumatic. Right Ear: Tympanic membrane, external ear and ear canal normal.   Left Ear: Tympanic membrane, external ear and ear canal normal.   Nose: Sinus tenderness (left side of nose) present. Left sinus exhibits maxillary sinus tenderness and frontal sinus tenderness. Mouth/Throat: Uvula is midline, oropharynx is clear and moist and mucous membranes are normal.   Eyes: Pupils are equal, round, and reactive to light. Conjunctivae are normal. Right eye exhibits no discharge. Left eye exhibits discharge (large amount of clear fluid). Left conjunctiva is not injected. Left conjunctiva has no hemorrhage. Neck: Trachea normal and normal range of motion. Neck supple. Cardiovascular: Normal rate, regular rhythm, S1 normal, S2 normal and normal heart sounds. No murmur heard. Pulses:       Radial pulses are 2+ on the right side, and 2+ on the left side. Posterior tibial pulses are 2+ on the right side, and 2+ on the left side. Pulmonary/Chest: Effort normal and breath sounds normal. No respiratory distress. She has no wheezes. She has no rhonchi. Abdominal: Soft. Bowel sounds are normal. She exhibits no distension. There is no hepatosplenomegaly. There is no tenderness. There is no rebound and no CVA tenderness. Musculoskeletal: Normal range of motion. She exhibits no edema or tenderness. Lymphadenopathy:        Head (left side): Submandibular adenopathy present. She has no cervical adenopathy. Neurological: She is alert and oriented to person, place, and time. She has normal strength. She exhibits normal muscle tone.  Coordination

## 2018-08-07 ENCOUNTER — TELEPHONE (OUTPATIENT)
Dept: NEUROLOGY | Age: 58
End: 2018-08-07

## 2018-08-07 DIAGNOSIS — I72.5 BASILAR ARTERY ANEURYSM (HCC): Primary | ICD-10-CM

## 2018-08-07 DIAGNOSIS — R93.89 ABNORMAL CT SCAN: Primary | ICD-10-CM

## 2018-08-07 NOTE — TELEPHONE ENCOUNTER
Called with preliminary results of CT scan. Possible lacrimal gland abscess. Left paranasal sinusitis. Called in antibiotic to start tonight. Will need to arrange ophth evaluation in the morning. Told her to go to [facility] tonight if symptoms worsen. She understood instructions. Ensure she starts medication tonight and take next dose early in morning tomorrow. Still need to address other findings on CT. Clinical staff will need to help arrange urgent Ophth evaluation tomorrow.

## 2018-08-07 NOTE — PROGRESS NOTES
New referral due to insurance restrictions [facility] was able to get patient scheduled with Dr Marquis Kebede at 215 pm today. Order, demographic, patient summary, imaging and office notes faxed to their office. Patient notified and will be at the appointment.

## 2018-08-07 NOTE — TELEPHONE ENCOUNTER
[facility] clinic contacted, patient to be seen today at 1230 pm. Patient notified and will arrive at 1215 pm. Patient provided their address & contact information. CT results, office notes, patient summary and demographic faxed to [facility].

## 2018-08-08 ENCOUNTER — TELEPHONE (OUTPATIENT)
Dept: NEUROLOGY | Age: 58
End: 2018-08-08

## 2018-08-08 DIAGNOSIS — I72.5 BASILAR ARTERY ANEURYSM (HCC): Primary | ICD-10-CM

## 2018-08-13 ENCOUNTER — TELEPHONE (OUTPATIENT)
Dept: FAMILY MEDICINE CLINIC | Age: 58
End: 2018-08-13

## 2018-08-18 NOTE — TELEPHONE ENCOUNTER
This encounter should not be closed. It is VERY important that patient has follow up with endovascular for her aneurysm - please make sure this is scheduled. DO NOT close encounters until patient is aware of information in them.

## 2018-10-02 ENCOUNTER — TELEPHONE (OUTPATIENT)
Dept: FAMILY MEDICINE CLINIC | Age: 58
End: 2018-10-02

## 2018-10-03 ENCOUNTER — OFFICE VISIT (OUTPATIENT)
Dept: FAMILY MEDICINE CLINIC | Age: 58
End: 2018-10-03
Payer: MEDICAID

## 2018-10-03 VITALS
SYSTOLIC BLOOD PRESSURE: 126 MMHG | HEART RATE: 90 BPM | WEIGHT: 115 LBS | DIASTOLIC BLOOD PRESSURE: 74 MMHG | RESPIRATION RATE: 20 BRPM | TEMPERATURE: 99 F | BODY MASS INDEX: 21.03 KG/M2

## 2018-10-03 DIAGNOSIS — M62.838 MUSCLE SPASM: Primary | ICD-10-CM

## 2018-10-03 DIAGNOSIS — Z98.890 S/P COIL EMBOLIZATION OF CEREBRAL ANEURYSM: ICD-10-CM

## 2018-10-03 PROCEDURE — 99214 OFFICE O/P EST MOD 30 MIN: CPT | Performed by: NURSE PRACTITIONER

## 2018-10-03 PROCEDURE — 1036F TOBACCO NON-USER: CPT | Performed by: NURSE PRACTITIONER

## 2018-10-03 PROCEDURE — G8420 CALC BMI NORM PARAMETERS: HCPCS | Performed by: NURSE PRACTITIONER

## 2018-10-03 PROCEDURE — G8427 DOCREV CUR MEDS BY ELIG CLIN: HCPCS | Performed by: NURSE PRACTITIONER

## 2018-10-03 PROCEDURE — G8484 FLU IMMUNIZE NO ADMIN: HCPCS | Performed by: NURSE PRACTITIONER

## 2018-10-03 PROCEDURE — 3017F COLORECTAL CA SCREEN DOC REV: CPT | Performed by: NURSE PRACTITIONER

## 2018-10-03 RX ORDER — TIZANIDINE 4 MG/1
4 TABLET ORAL EVERY 6 HOURS PRN
Qty: 90 TABLET | Refills: 0 | Status: SHIPPED | OUTPATIENT
Start: 2018-10-03 | End: 2019-01-14 | Stop reason: SDUPTHER

## 2018-10-03 RX ORDER — CLOPIDOGREL BISULFATE 75 MG/1
75 TABLET ORAL DAILY
COMMUNITY
Start: 2018-09-27 | End: 2020-03-05 | Stop reason: SDUPTHER

## 2018-10-03 RX ORDER — ASPIRIN 81 MG/1
81 TABLET, CHEWABLE ORAL DAILY
COMMUNITY
Start: 2018-08-20

## 2018-10-03 ASSESSMENT — PATIENT HEALTH QUESTIONNAIRE - PHQ9
2. FEELING DOWN, DEPRESSED OR HOPELESS: 1
SUM OF ALL RESPONSES TO PHQ QUESTIONS 1-9: 2
1. LITTLE INTEREST OR PLEASURE IN DOING THINGS: 1
SUM OF ALL RESPONSES TO PHQ QUESTIONS 1-9: 2
SUM OF ALL RESPONSES TO PHQ9 QUESTIONS 1 & 2: 2

## 2018-10-03 ASSESSMENT — ENCOUNTER SYMPTOMS
SHORTNESS OF BREATH: 0
COUGH: 0

## 2018-10-11 ENCOUNTER — TELEPHONE (OUTPATIENT)
Dept: FAMILY MEDICINE CLINIC | Age: 58
End: 2018-10-11

## 2018-10-20 ENCOUNTER — HOSPITAL ENCOUNTER (OUTPATIENT)
Age: 58
Setting detail: SPECIMEN
Discharge: HOME | End: 2018-10-20
Payer: MEDICAID

## 2018-10-20 ENCOUNTER — OFFICE VISIT (OUTPATIENT)
Dept: FAMILY MEDICINE CLINIC | Age: 58
End: 2018-10-20
Payer: MEDICAID

## 2018-10-20 VITALS
TEMPERATURE: 98.9 F | RESPIRATION RATE: 20 BRPM | DIASTOLIC BLOOD PRESSURE: 78 MMHG | SYSTOLIC BLOOD PRESSURE: 110 MMHG | HEART RATE: 88 BPM | WEIGHT: 113.6 LBS | BODY MASS INDEX: 20.78 KG/M2

## 2018-10-20 DIAGNOSIS — N30.00 ACUTE CYSTITIS WITHOUT HEMATURIA: ICD-10-CM

## 2018-10-20 DIAGNOSIS — I61.9 STROKE, HEMORRHAGIC (HCC): Primary | ICD-10-CM

## 2018-10-20 DIAGNOSIS — R26.9 GAIT DISTURBANCE, POST-STROKE: ICD-10-CM

## 2018-10-20 DIAGNOSIS — R20.2 NUMBNESS AND TINGLING IN LEFT ARM: ICD-10-CM

## 2018-10-20 DIAGNOSIS — I69.398 GAIT DISTURBANCE, POST-STROKE: ICD-10-CM

## 2018-10-20 DIAGNOSIS — R20.0 NUMBNESS AND TINGLING IN LEFT ARM: ICD-10-CM

## 2018-10-20 DIAGNOSIS — I69.30 HISTORY OF STROKE WITH RESIDUAL DEFICIT: ICD-10-CM

## 2018-10-20 DIAGNOSIS — R53.1 RIGHT SIDED WEAKNESS: ICD-10-CM

## 2018-10-20 DIAGNOSIS — F51.04 PSYCHOPHYSIOLOGICAL INSOMNIA: ICD-10-CM

## 2018-10-20 LAB
BILIRUBIN, POC: NEGATIVE
BLOOD URINE, POC: ABNORMAL
CLARITY, POC: ABNORMAL
COLOR, POC: YELLOW
GLUCOSE URINE, POC: NEGATIVE
KETONES, POC: NEGATIVE
LEUKOCYTE EST, POC: ABNORMAL
NITRITE, POC: NEGATIVE
PH, POC: 6
PROTEIN, POC: ABNORMAL
SPECIFIC GRAVITY, POC: >=1.03
UROBILINOGEN, POC: ABNORMAL

## 2018-10-20 PROCEDURE — G8427 DOCREV CUR MEDS BY ELIG CLIN: HCPCS | Performed by: NURSE PRACTITIONER

## 2018-10-20 PROCEDURE — 1036F TOBACCO NON-USER: CPT | Performed by: NURSE PRACTITIONER

## 2018-10-20 PROCEDURE — 81002 URINALYSIS NONAUTO W/O SCOPE: CPT | Performed by: NURSE PRACTITIONER

## 2018-10-20 PROCEDURE — G8420 CALC BMI NORM PARAMETERS: HCPCS | Performed by: NURSE PRACTITIONER

## 2018-10-20 PROCEDURE — G8484 FLU IMMUNIZE NO ADMIN: HCPCS | Performed by: NURSE PRACTITIONER

## 2018-10-20 PROCEDURE — 3017F COLORECTAL CA SCREEN DOC REV: CPT | Performed by: NURSE PRACTITIONER

## 2018-10-20 PROCEDURE — 99214 OFFICE O/P EST MOD 30 MIN: CPT | Performed by: NURSE PRACTITIONER

## 2018-10-20 RX ORDER — LISINOPRIL 10 MG/1
10 TABLET ORAL DAILY
COMMUNITY
Start: 2018-08-10 | End: 2018-10-31 | Stop reason: SINTOL

## 2018-10-20 RX ORDER — NITROFURANTOIN 25; 75 MG/1; MG/1
100 CAPSULE ORAL 2 TIMES DAILY
Qty: 14 CAPSULE | Refills: 0 | Status: SHIPPED | OUTPATIENT
Start: 2018-10-20 | End: 2018-10-27

## 2018-10-20 RX ORDER — AMOXICILLIN 250 MG
1 CAPSULE ORAL PRN
COMMUNITY
Start: 2018-10-15 | End: 2018-11-14

## 2018-10-20 RX ORDER — TRAZODONE HYDROCHLORIDE 50 MG/1
50 TABLET ORAL NIGHTLY
Qty: 30 TABLET | Refills: 0 | Status: SHIPPED | OUTPATIENT
Start: 2018-10-20 | End: 2018-10-31 | Stop reason: SDUPTHER

## 2018-10-20 ASSESSMENT — ENCOUNTER SYMPTOMS
TROUBLE SWALLOWING: 0
RHINORRHEA: 0
EYE REDNESS: 0
EYE DISCHARGE: 0
CONSTIPATION: 0
VOICE CHANGE: 1
EYE ITCHING: 0
SHORTNESS OF BREATH: 0
ABDOMINAL PAIN: 0
DIARRHEA: 0
SORE THROAT: 0
NAUSEA: 0
COUGH: 0

## 2018-10-22 LAB
CULTURE: NORMAL
Lab: NORMAL
SPECIMEN DESCRIPTION: NORMAL
STATUS: NORMAL

## 2018-10-31 ENCOUNTER — OFFICE VISIT (OUTPATIENT)
Dept: FAMILY MEDICINE CLINIC | Age: 58
End: 2018-10-31
Payer: MEDICAID

## 2018-10-31 ENCOUNTER — HOSPITAL ENCOUNTER (OUTPATIENT)
Age: 58
Setting detail: SPECIMEN
Discharge: HOME | End: 2018-10-31
Payer: MEDICAID

## 2018-10-31 VITALS
DIASTOLIC BLOOD PRESSURE: 68 MMHG | TEMPERATURE: 99.2 F | HEART RATE: 92 BPM | RESPIRATION RATE: 16 BRPM | SYSTOLIC BLOOD PRESSURE: 114 MMHG | WEIGHT: 110 LBS | BODY MASS INDEX: 20.12 KG/M2

## 2018-10-31 DIAGNOSIS — H65.02 ACUTE SEROUS OTITIS MEDIA OF LEFT EAR, RECURRENCE NOT SPECIFIED: ICD-10-CM

## 2018-10-31 DIAGNOSIS — F51.04 PSYCHOPHYSIOLOGICAL INSOMNIA: ICD-10-CM

## 2018-10-31 DIAGNOSIS — J02.9 ACUTE VIRAL PHARYNGITIS: Primary | ICD-10-CM

## 2018-10-31 DIAGNOSIS — T78.40XA ALLERGIC REACTION TO DRUG, INITIAL ENCOUNTER: ICD-10-CM

## 2018-10-31 DIAGNOSIS — J30.89 NON-SEASONAL ALLERGIC RHINITIS, UNSPECIFIED TRIGGER: ICD-10-CM

## 2018-10-31 DIAGNOSIS — J02.9 SORE THROAT: ICD-10-CM

## 2018-10-31 DIAGNOSIS — R22.0 SWELLING OF UPPER LIP: ICD-10-CM

## 2018-10-31 PROBLEM — I67.1 CEREBRAL ANEURYSM, NONRUPTURED: Status: ACTIVE | Noted: 2018-08-29

## 2018-10-31 PROBLEM — I72.9 ANEURYSM (HCC): Status: ACTIVE | Noted: 2018-08-30

## 2018-10-31 PROBLEM — I61.9 INTRACEREBRAL HEMORRHAGE (HCC): Status: ACTIVE | Noted: 2018-10-08

## 2018-10-31 LAB — S PYO AG THROAT QL: NORMAL

## 2018-10-31 PROCEDURE — G8427 DOCREV CUR MEDS BY ELIG CLIN: HCPCS | Performed by: NURSE PRACTITIONER

## 2018-10-31 PROCEDURE — 3017F COLORECTAL CA SCREEN DOC REV: CPT | Performed by: NURSE PRACTITIONER

## 2018-10-31 PROCEDURE — 1036F TOBACCO NON-USER: CPT | Performed by: NURSE PRACTITIONER

## 2018-10-31 PROCEDURE — 87880 STREP A ASSAY W/OPTIC: CPT | Performed by: NURSE PRACTITIONER

## 2018-10-31 PROCEDURE — G8420 CALC BMI NORM PARAMETERS: HCPCS | Performed by: NURSE PRACTITIONER

## 2018-10-31 PROCEDURE — G8484 FLU IMMUNIZE NO ADMIN: HCPCS | Performed by: NURSE PRACTITIONER

## 2018-10-31 PROCEDURE — 99214 OFFICE O/P EST MOD 30 MIN: CPT | Performed by: NURSE PRACTITIONER

## 2018-10-31 RX ORDER — TRAZODONE HYDROCHLORIDE 50 MG/1
100 TABLET ORAL NIGHTLY
Qty: 60 TABLET | Refills: 1 | Status: SHIPPED | OUTPATIENT
Start: 2018-10-31 | End: 2018-12-14 | Stop reason: SDUPTHER

## 2018-10-31 RX ORDER — FLUTICASONE PROPIONATE 50 MCG
1 SPRAY, SUSPENSION (ML) NASAL DAILY
Qty: 1 BOTTLE | Refills: 0 | COMMUNITY
Start: 2018-10-31 | End: 2019-04-16 | Stop reason: ALTCHOICE

## 2018-10-31 RX ORDER — METHYLPREDNISOLONE 4 MG/1
TABLET ORAL
Qty: 1 KIT | Refills: 0 | Status: SHIPPED | OUTPATIENT
Start: 2018-10-31 | End: 2018-11-06

## 2018-10-31 ASSESSMENT — ENCOUNTER SYMPTOMS
CHEST TIGHTNESS: 0
DIARRHEA: 0
COUGH: 1
CONSTIPATION: 1
VOMITING: 0
ABDOMINAL PAIN: 0
TROUBLE SWALLOWING: 1
SINUS PRESSURE: 0
WHEEZING: 1
VOICE CHANGE: 1
FACIAL SWELLING: 1
RHINORRHEA: 0
NAUSEA: 0
SORE THROAT: 1
SHORTNESS OF BREATH: 1
EYES NEGATIVE: 1

## 2018-10-31 NOTE — PATIENT INSTRUCTIONS
lozenges to soothe pain. Regular cough drops or hard candy may also help. These should not be given to young children because of the risk of choking. · Do not smoke or allow others to smoke around you. If you need help quitting, talk to your doctor about stop-smoking programs and medicines. These can increase your chances of quitting for good. · Use a vaporizer or humidifier to add moisture to your bedroom. Follow the directions for cleaning the machine. When should you call for help? Call your doctor now or seek immediate medical care if:    · You have new or worse trouble swallowing.     · Your sore throat gets much worse on one side.    Watch closely for changes in your health, and be sure to contact your doctor if you do not get better as expected. Where can you learn more? Go to https://SayHello LLCpezulayeb.Tongda. org and sign in to your Digg account. Enter I905 in the Bionostra box to learn more about \"Sore Throat: Care Instructions. \"     If you do not have an account, please click on the \"Sign Up Now\" link. Current as of: May 12, 2017  Content Version: 11.7  © 3166-0731 WebinarHero, Incorporated. Care instructions adapted under license by [facility] ([facility]). If you have questions about a medical condition or this instruction, always ask your healthcare professional. [address] any warranty or liability for your use of this information.

## 2018-10-31 NOTE — PROGRESS NOTES
CHRIS) 4 MG tablet   5. Psychophysiological insomnia  traZODone (DESYREL) 50 MG tablet   6. Swelling of upper lip     7. Allergic reaction to drug, initial encounter         Plan:     Stop lisinopril - added to allergy list  Rapid strep negative  Send throat swab for culture  Recommend continue to increase oral fluids to prevent dehydration. Use saline rinse to nose as needed for congestion. Recommend use humidifier at night  May use hot tea with honey and lemon for sore throat/cough  Start zyrtec and flonase daily  Start medrol dosepak as prescribed  Increase trazodone to 100 mg nightly to help with sleep  Monitor for worsening symptoms   Call with concerns. Return if symptoms worsen or fail to improve. Josepsheila Flores received counseling on the following healthy behaviors: nutrition and medication adherence  Reviewed prior labs and health maintenance. Continue current medications, diet and exercise. Discussed use, benefit, and side effects of prescribed medications. Barriers to medication compliance addressed. Patient given educational materials - see patient instructions. All patient questions answered. Patient voiced understanding.            Electronically signed by ALIRIO Philip CNP on 11/4/2018 at 12:33 AM

## 2018-11-01 LAB
DIRECT EXAM: NORMAL
Lab: NORMAL
SPECIMEN DESCRIPTION: NORMAL
STATUS: NORMAL

## 2018-11-04 DIAGNOSIS — I67.1 CEREBRAL ANEURYSM, NONRUPTURED: Primary | ICD-10-CM

## 2018-11-04 DIAGNOSIS — I69.30 HISTORY OF STROKE WITH RESIDUAL DEFICIT: ICD-10-CM

## 2018-11-04 DIAGNOSIS — D64.9 ANEMIA, UNSPECIFIED TYPE: ICD-10-CM

## 2018-11-04 DIAGNOSIS — I61.9 STROKE, HEMORRHAGIC (HCC): ICD-10-CM

## 2018-11-04 ASSESSMENT — ENCOUNTER SYMPTOMS
SWOLLEN GLANDS: 1
CHANGE IN BOWEL HABIT: 0

## 2018-11-05 ENCOUNTER — TELEPHONE (OUTPATIENT)
Dept: FAMILY MEDICINE CLINIC | Age: 58
End: 2018-11-05

## 2018-11-05 RX ORDER — AZITHROMYCIN 250 MG/1
TABLET, FILM COATED ORAL
Qty: 1 PACKET | Refills: 0 | Status: SHIPPED | OUTPATIENT
Start: 2018-11-05 | End: 2018-11-09

## 2018-11-13 ENCOUNTER — TELEPHONE (OUTPATIENT)
Dept: FAMILY MEDICINE CLINIC | Age: 58
End: 2018-11-13

## 2018-11-13 DIAGNOSIS — I10 ESSENTIAL HYPERTENSION: Primary | ICD-10-CM

## 2018-11-13 RX ORDER — LOSARTAN POTASSIUM 25 MG/1
25 TABLET ORAL DAILY
Qty: 30 TABLET | Refills: 1 | Status: SHIPPED | OUTPATIENT
Start: 2018-11-13 | End: 2019-01-10 | Stop reason: ALTCHOICE

## 2018-12-14 DIAGNOSIS — F51.04 PSYCHOPHYSIOLOGICAL INSOMNIA: ICD-10-CM

## 2018-12-17 RX ORDER — TRAZODONE HYDROCHLORIDE 50 MG/1
100 TABLET ORAL NIGHTLY
Qty: 60 TABLET | Refills: 5 | Status: SHIPPED | OUTPATIENT
Start: 2018-12-17 | End: 2019-06-17 | Stop reason: SDUPTHER

## 2019-01-10 ENCOUNTER — OFFICE VISIT (OUTPATIENT)
Dept: FAMILY MEDICINE CLINIC | Age: 59
End: 2019-01-10
Payer: MEDICAID

## 2019-01-10 ENCOUNTER — HOSPITAL ENCOUNTER (OUTPATIENT)
Age: 59
Setting detail: SPECIMEN
Discharge: HOME | End: 2019-01-10
Payer: MEDICAID

## 2019-01-10 VITALS
SYSTOLIC BLOOD PRESSURE: 114 MMHG | BODY MASS INDEX: 20.67 KG/M2 | RESPIRATION RATE: 16 BRPM | HEART RATE: 88 BPM | TEMPERATURE: 98.3 F | DIASTOLIC BLOOD PRESSURE: 76 MMHG | WEIGHT: 113 LBS

## 2019-01-10 DIAGNOSIS — D64.9 ANEMIA, UNSPECIFIED TYPE: ICD-10-CM

## 2019-01-10 DIAGNOSIS — I69.30 HISTORY OF STROKE WITH RESIDUAL DEFICIT: ICD-10-CM

## 2019-01-10 DIAGNOSIS — I10 ESSENTIAL HYPERTENSION: ICD-10-CM

## 2019-01-10 DIAGNOSIS — F41.1 GENERALIZED ANXIETY DISORDER: ICD-10-CM

## 2019-01-10 DIAGNOSIS — I67.1 CEREBRAL ANEURYSM, NONRUPTURED: ICD-10-CM

## 2019-01-10 DIAGNOSIS — F32.1 MAJOR DEPRESSIVE DISORDER, SINGLE EPISODE, MODERATE (HCC): Primary | Chronic | ICD-10-CM

## 2019-01-10 DIAGNOSIS — I61.9 STROKE, HEMORRHAGIC (HCC): ICD-10-CM

## 2019-01-10 LAB
ABSOLUTE EOS #: 0.07 K/UL (ref 0–0.44)
ABSOLUTE IMMATURE GRANULOCYTE: <0.03 K/UL (ref 0–0.3)
ABSOLUTE LYMPH #: 2.28 K/UL (ref 1.1–3.7)
ABSOLUTE MONO #: 0.62 K/UL (ref 0.1–1.2)
ALBUMIN SERPL-MCNC: 4.2 G/DL (ref 3.5–5.2)
ALBUMIN/GLOBULIN RATIO: 1.4 (ref 1–2.5)
ALP BLD-CCNC: 107 U/L (ref 35–104)
ALT SERPL-CCNC: 7 U/L (ref 5–33)
ANION GAP SERPL CALCULATED.3IONS-SCNC: 15 MMOL/L (ref 9–17)
AST SERPL-CCNC: 12 U/L
BASOPHILS # BLD: 1 % (ref 0–2)
BASOPHILS ABSOLUTE: 0.1 K/UL (ref 0–0.2)
BILIRUB SERPL-MCNC: <0.1 MG/DL (ref 0.3–1.2)
BUN BLDV-MCNC: 11 MG/DL (ref 6–20)
BUN/CREAT BLD: ABNORMAL (ref 9–20)
CALCIUM SERPL-MCNC: 9.1 MG/DL (ref 8.6–10.4)
CHLORIDE BLD-SCNC: 93 MMOL/L (ref 98–107)
CHOLESTEROL/HDL RATIO: 4
CHOLESTEROL: 270 MG/DL
CO2: 26 MMOL/L (ref 20–31)
CREAT SERPL-MCNC: 0.79 MG/DL (ref 0.5–0.9)
DIFFERENTIAL TYPE: ABNORMAL
EOSINOPHILS RELATIVE PERCENT: 1 % (ref 1–4)
GFR AFRICAN AMERICAN: >60 ML/MIN
GFR NON-AFRICAN AMERICAN: >60 ML/MIN
GFR SERPL CREATININE-BSD FRML MDRD: ABNORMAL ML/MIN/{1.73_M2}
GFR SERPL CREATININE-BSD FRML MDRD: ABNORMAL ML/MIN/{1.73_M2}
GLUCOSE BLD-MCNC: 89 MG/DL (ref 70–99)
HCT VFR BLD CALC: 35.1 % (ref 36.3–47.1)
HDLC SERPL-MCNC: 68 MG/DL
HEMOGLOBIN: 10.9 G/DL (ref 11.9–15.1)
IMMATURE GRANULOCYTES: 0 %
LDL CHOLESTEROL: 149 MG/DL (ref 0–130)
LYMPHOCYTES # BLD: 29 % (ref 24–43)
MCH RBC QN AUTO: 27.7 PG (ref 25.2–33.5)
MCHC RBC AUTO-ENTMCNC: 31.1 G/DL (ref 28.4–34.8)
MCV RBC AUTO: 89.1 FL (ref 82.6–102.9)
MONOCYTES # BLD: 8 % (ref 3–12)
NRBC AUTOMATED: 0 PER 100 WBC
PDW BLD-RTO: 14.2 % (ref 11.8–14.4)
PLATELET # BLD: 255 K/UL (ref 138–453)
PLATELET ESTIMATE: ABNORMAL
PMV BLD AUTO: 9.3 FL (ref 8.1–13.5)
POTASSIUM SERPL-SCNC: 4.3 MMOL/L (ref 3.7–5.3)
RBC # BLD: 3.94 M/UL (ref 3.95–5.11)
RBC # BLD: ABNORMAL 10*6/UL
SEG NEUTROPHILS: 61 % (ref 36–65)
SEGMENTED NEUTROPHILS ABSOLUTE COUNT: 4.68 K/UL (ref 1.5–8.1)
SODIUM BLD-SCNC: 134 MMOL/L (ref 135–144)
TOTAL PROTEIN: 7.1 G/DL (ref 6.4–8.3)
TRIGL SERPL-MCNC: 266 MG/DL
VLDLC SERPL CALC-MCNC: ABNORMAL MG/DL (ref 1–30)
WBC # BLD: 7.8 K/UL (ref 3.5–11.3)
WBC # BLD: ABNORMAL 10*3/UL

## 2019-01-10 PROCEDURE — 3017F COLORECTAL CA SCREEN DOC REV: CPT | Performed by: NURSE PRACTITIONER

## 2019-01-10 PROCEDURE — G8484 FLU IMMUNIZE NO ADMIN: HCPCS | Performed by: NURSE PRACTITIONER

## 2019-01-10 PROCEDURE — G8420 CALC BMI NORM PARAMETERS: HCPCS | Performed by: NURSE PRACTITIONER

## 2019-01-10 PROCEDURE — 99214 OFFICE O/P EST MOD 30 MIN: CPT | Performed by: NURSE PRACTITIONER

## 2019-01-10 PROCEDURE — G8427 DOCREV CUR MEDS BY ELIG CLIN: HCPCS | Performed by: NURSE PRACTITIONER

## 2019-01-10 PROCEDURE — 1036F TOBACCO NON-USER: CPT | Performed by: NURSE PRACTITIONER

## 2019-01-10 RX ORDER — OLMESARTAN MEDOXOMIL 20 MG/1
20 TABLET ORAL DAILY
Qty: 30 TABLET | Refills: 3 | Status: SHIPPED | OUTPATIENT
Start: 2019-01-10 | End: 2019-04-16 | Stop reason: ALTCHOICE

## 2019-01-10 RX ORDER — VENLAFAXINE HYDROCHLORIDE 150 MG/1
150 CAPSULE, EXTENDED RELEASE ORAL DAILY
Qty: 30 CAPSULE | Refills: 5 | Status: SHIPPED | OUTPATIENT
Start: 2019-01-10 | End: 2019-07-15 | Stop reason: SDUPTHER

## 2019-01-10 RX ORDER — AMLODIPINE BESYLATE 2.5 MG/1
2.5 TABLET ORAL DAILY
COMMUNITY
Start: 2019-01-02 | End: 2019-12-13 | Stop reason: SDUPTHER

## 2019-01-10 ASSESSMENT — ENCOUNTER SYMPTOMS
DIARRHEA: 0
BACK PAIN: 1
SINUS PRESSURE: 0
VOMITING: 0
EYES NEGATIVE: 1
NAUSEA: 0
COUGH: 0
ABDOMINAL PAIN: 0
CONSTIPATION: 0
SORE THROAT: 0
BLOOD IN STOOL: 0
WHEEZING: 0
VOICE CHANGE: 1
SHORTNESS OF BREATH: 1
RHINORRHEA: 0

## 2019-01-11 DIAGNOSIS — D50.9 IRON DEFICIENCY ANEMIA, UNSPECIFIED IRON DEFICIENCY ANEMIA TYPE: Primary | ICD-10-CM

## 2019-01-11 DIAGNOSIS — E78.2 MIXED HYPERLIPIDEMIA: ICD-10-CM

## 2019-01-11 DIAGNOSIS — I61.9 LEFT-SIDED NONTRAUMATIC INTRACEREBRAL HEMORRHAGE, UNSPECIFIED CEREBRAL LOCATION (HCC): ICD-10-CM

## 2019-01-11 LAB
FERRITIN: 20 UG/L (ref 13–150)
IRON SATURATION: 14 % (ref 20–55)
IRON: 51 UG/DL (ref 37–145)
TOTAL IRON BINDING CAPACITY: 375 UG/DL (ref 250–450)
UNSATURATED IRON BINDING CAPACITY: 324 UG/DL (ref 112–347)

## 2019-01-11 RX ORDER — ROSUVASTATIN CALCIUM 10 MG/1
10 TABLET, COATED ORAL NIGHTLY
Qty: 30 TABLET | Refills: 3 | Status: SHIPPED | OUTPATIENT
Start: 2019-01-11 | End: 2019-04-16 | Stop reason: ALTCHOICE

## 2019-01-14 ENCOUNTER — TELEPHONE (OUTPATIENT)
Dept: FAMILY MEDICINE CLINIC | Age: 59
End: 2019-01-14

## 2019-01-14 DIAGNOSIS — M62.838 MUSCLE SPASM: ICD-10-CM

## 2019-01-14 RX ORDER — TIZANIDINE 4 MG/1
4 TABLET ORAL EVERY 8 HOURS PRN
Qty: 30 TABLET | Refills: 0 | Status: SHIPPED | OUTPATIENT
Start: 2019-01-14 | End: 2019-02-13

## 2019-01-28 DIAGNOSIS — G40.309 GENERALIZED CONVULSIVE EPILEPSY WITHOUT INTRACTABLE EPILEPSY (HCC): ICD-10-CM

## 2019-01-28 RX ORDER — PHENYTOIN SODIUM 100 MG/1
CAPSULE, EXTENDED RELEASE ORAL
Qty: 150 CAPSULE | Refills: 1 | Status: SHIPPED | OUTPATIENT
Start: 2019-01-28 | End: 2019-03-27 | Stop reason: SDUPTHER

## 2019-03-27 DIAGNOSIS — G40.309 GENERALIZED CONVULSIVE EPILEPSY WITHOUT INTRACTABLE EPILEPSY (HCC): ICD-10-CM

## 2019-03-29 RX ORDER — PHENYTOIN SODIUM 100 MG/1
CAPSULE, EXTENDED RELEASE ORAL
Qty: 150 CAPSULE | Refills: 0 | Status: SHIPPED | OUTPATIENT
Start: 2019-03-29 | End: 2019-04-16 | Stop reason: SDUPTHER

## 2019-04-16 ENCOUNTER — OFFICE VISIT (OUTPATIENT)
Dept: NEUROLOGY | Age: 59
End: 2019-04-16
Payer: MEDICAID

## 2019-04-16 VITALS
DIASTOLIC BLOOD PRESSURE: 80 MMHG | BODY MASS INDEX: 20.24 KG/M2 | SYSTOLIC BLOOD PRESSURE: 128 MMHG | HEART RATE: 97 BPM | WEIGHT: 110 LBS | HEIGHT: 62 IN

## 2019-04-16 DIAGNOSIS — R53.1 RIGHT SIDED WEAKNESS: ICD-10-CM

## 2019-04-16 DIAGNOSIS — I61.9 LEFT-SIDED NONTRAUMATIC INTRACEREBRAL HEMORRHAGE, UNSPECIFIED CEREBRAL LOCATION (HCC): ICD-10-CM

## 2019-04-16 DIAGNOSIS — I72.5 BASILAR ARTERY ANEURYSM (HCC): Primary | ICD-10-CM

## 2019-04-16 DIAGNOSIS — Z86.79 HISTORY OF INTRACRANIAL HEMORRHAGE: ICD-10-CM

## 2019-04-16 DIAGNOSIS — G40.309 GENERALIZED CONVULSIVE EPILEPSY WITHOUT INTRACTABLE EPILEPSY (HCC): ICD-10-CM

## 2019-04-16 PROCEDURE — G8427 DOCREV CUR MEDS BY ELIG CLIN: HCPCS | Performed by: PSYCHIATRY & NEUROLOGY

## 2019-04-16 PROCEDURE — 99214 OFFICE O/P EST MOD 30 MIN: CPT | Performed by: PSYCHIATRY & NEUROLOGY

## 2019-04-16 PROCEDURE — G8420 CALC BMI NORM PARAMETERS: HCPCS | Performed by: PSYCHIATRY & NEUROLOGY

## 2019-04-16 PROCEDURE — 3017F COLORECTAL CA SCREEN DOC REV: CPT | Performed by: PSYCHIATRY & NEUROLOGY

## 2019-04-16 PROCEDURE — 1036F TOBACCO NON-USER: CPT | Performed by: PSYCHIATRY & NEUROLOGY

## 2019-04-16 RX ORDER — PHENYTOIN SODIUM 100 MG/1
CAPSULE, EXTENDED RELEASE ORAL
Qty: 150 CAPSULE | Refills: 6 | Status: SHIPPED | OUTPATIENT
Start: 2019-04-16 | End: 2019-07-23 | Stop reason: SDUPTHER

## 2019-04-16 NOTE — PROGRESS NOTES
NEUROLOGY FOLLOW-UP  Patient Name:  Aissatou Sorto  :   1960  Clinic Visit Date: 2019      I saw Ms. Aissatou Sorto in follow-up in the office today in continuation of neurologic care. She is a 62 y.o.  right-handed  female with generalized tonic clonic seizure disorder with the family history of epilepsy. She comes to clinic stating that she was hospitalized at the [facility] in 2018 and she was found to have any reason inside the brain. She also had embolization therapy for cerebral aneurysm and she again needed to be hospitalized in 2 weeks for intracranial bleed. Since then she has been having right-sided weakness and gait difficulties. She has been going through therapy. She also stated that she has not had any recurrence of seizure activity during her recent hospitalization. Her last seizure episode with loss of consciousness was in 2016. She had hx of vocal cord cancer Clifford 2015) s/p radiation therapy (completed 2016) was evaluated initially in 2016 and then has had EMG for right UE paresthesias  Since then, she has been using wrist splint and paresthesias had been stable. She has not had any aggravation of sensory symptomatology. Denied associated weakness. Juanito Padron \"I hate taking pills but I am also very worried about having another seizure; please dont cut down my seizure medication at this time\". She is extremely concerned about having a recurrence of seizure. Patient is extremely hypophonic from vocal cord cancer related hoarseness. She is requesting not to make any changes in seizure meds. She denied hx of neck and back injury. Denies neck pain and elbow pain but she has been having right wrist pain occurring intermittently. She also stated that she was having intermittent pain and paresthesias earlier but for the past few weeks it has been on constant basis. She also has weakness involving fifth visit of right hand.   She has not been able to hold any items due to weak hand . Denies numbness or weakness in left upper extremity and bilateral lower extremities. Denies headaches and dizziness and vision changes. Denies diabetes. She also had hx of seizure disorder and seizure free until August 2016. Prior to it was last sz, GTC sz was 1997. She has been on Dilantin 200mg + 200mg/day. She was told to have low blood sugar and also family hx of seizures. Her younger brother and maternal uncle has seizure disorder. Review of systems done by staff reviewed and pertinent positives include memory difficulties, right-sided weakness, gait difficulties as stated above. Current Outpatient Medications on File Prior to Visit   Medication Sig Dispense Refill    phenytoin (DILANTIN) 100 MG ER capsule TAKE TWO CAPSULES BY MOUTH EVERY MORNING AND TAKE THREE CAPSULES BY MOUTH EVERY NIGHT 150 capsule 0    amLODIPine (NORVASC) 2.5 MG tablet Take 2.5 mg by mouth daily      venlafaxine (EFFEXOR XR) 150 MG extended release capsule Take 1 capsule by mouth daily 30 capsule 5    traZODone (DESYREL) 50 MG tablet Take 2 tablets by mouth nightly 60 tablet 5    aspirin 81 MG chewable tablet Take 81 mg by mouth daily      clopidogrel (PLAVIX) 75 MG tablet Take 75 mg by mouth daily       No current facility-administered medications on file prior to visit. Allergies: Damian Blackmon is allergic to lisinopril.     Past Medical History:   Diagnosis Date    Anemia     Anxiety     Arthritis     Colon polyp 10/04/2017    multi adenomas    COPD [facility] disease) [facility]     G tube feedings ([facility] [facility] 2/2016    every 3-4 hours    History of blood transfusion 1990    x 6 during child birth    Hypoglycemia     Insomnia     Seizures ([facility]     Squamous cell carcinoma of vocal cord ([facility] [address] 11/17/2015    with history of radiation treatments       Past Surgical History:   Procedure Laterality Date    BRONCHOSCOPY  11/5/15    COLONOSCOPY  10/04/2017    multi polyps; poor prep    DENTAL SURGERY Bilateral     LARYNGOSCOPY Right 11/5/15    LUMBAR FUSION  02/07/2018    L3-S1    NERVE BLOCK  11/03/2017    caudal #1, decadron 10 mg    NERVE BLOCK  11/10/2017    caudal #2 decadron 10mg    NERVE BLOCK Left 12/22/2017    left transforaminal #1 decadron 10mg    MA COLONOSCOPY W/BIOPSY SINGLE/MULTIPLE  10/4/2017    COLONOSCOPY WITH BIOPSY performed by Axel Wild MD at [address]  11/5/15    VOCAL CORD AUGMENTATION W/RADIESSE INJ Bilateral      Social History: Suzan Cheatham  reports that she quit smoking about 2 years ago. Her smoking use included cigarettes. She started smoking about 28 years ago. She has a 37.50 pack-year smoking history. She has never used smokeless tobacco. She reports that she has current or past drug history. Drug: Marijuana. Frequency: 5.00 times per week. She reports that she does not drink alcohol. Family History   Problem Relation Age of Onset    High Blood Pressure Father     COPD Father     Heart Disease Father     Heart Disease Maternal Grandmother     Diabetes Maternal Grandmother     High Blood Pressure Maternal Grandmother     Heart Disease Maternal Grandfather     Heart Disease Sister     Other Brother         epilepsy    Heart Disease Brother      On exam: Blood pressure 128/80, pulse 97, height 5' 2\" (1.575 m), weight 110 lb (49.9 kg), not currently breastfeeding.           NEUROLOGIC EXAMINATION  GENERAL  Appears comfortable and in no distress   HEENT  NC/ AT   NECK  Supple and no bruits heard   MENTAL STATUS:  Alert, oriented, intact memory, no confusion, dysarthric speech (chronic), normal language, no hallucination or delusion   CRANIAL NERVES: II     -      Visual fields intact to confrontation  III,IV,VI -  EOMs full, no afferent defect, no                      HAIDER, no ptosis  V     -     Normal facial sensation  VII    -     Normal facial symmetry  VIII   -     Intact hearing  IX,X -     Symmetrical palate  XI    -     Symmetrical shoulder shrug  XII   -     Midline tongue, no atrophy    MOTOR FUNCTION:  significant for weakness of grade 4+/5 in Rt upper and Rt lower extremities; 5/5 in left upper and left lower extremities with normal bulk, normal tone and no involuntary movements, no tremor   SENSORY FUNCTION:  decreased LT, PP in Rt UE and Rt LE   CEREBELLAR FUNCTION:  signficant for dysmetria on FNF on right side and intact fine motor control over left UE   REFLEX FUNCTION:  Symmetric, no perverted reflex, no Babinski sign   STATION and GAIT  Normal station, hemiplegic gait             Diagnostic data reviewed with the patient:   NCS/ EMG of Rt UE (7/8/16): Demonstrated electrophysiologic evidence of ulnar neuropathy in right upper extremity localizable to the right forearm proximal to the right wrist.  Also demonstrated right median neuropathy at or distal to the right wrist suggestive of right carpal tunnel syndrome of mild severity. EEG (8/9/16): unremarkable. CT head and MRI Brain done reportedly in the past at [facility] else\" and none found; now patient is agreeable to do it. Impression and Plan: Ms. Damian Blackmon is a 62 y.o. female with     Hx of hosp on Sept 25th 2018 hosp for large basilar artery aneurysm; s/p stent assisted coil embolization at [facility]; discharged to home on dual antiplatelet therapy  Rpt hosp on October 8th 2018 for small left thalamic bleed with resultant right sided weakness and gait ataxia; Antiplatelets held for few days and then started back on ASA and Plavix as per endovascular neurologist, dr Latricia Hanley at [facility]. She is to continue PT/ OT.      Generalized tonic-clonic epilepsy: last breakthrough seizure with LOC on 8/13/16; presently on Dilantin 200 in a.m. and 300 in the evening; free and total levels were checked during recent hosp and those were subtherapeutic

## 2019-04-16 NOTE — PROGRESS NOTES
NEUROLOGY FOLLOW-UP  Patient Name:  Gaurav Shore  :   1960  Clinic Visit Date: 2019        REVIEW OF SYSTEMS  Constitutional Weight changes: absent, Fevers : absent, Fatigue: present; Any recent hospitalizations:  absent.    HEENT Ears: normal, Eyes: glasses, Visual disturbance: absent   Reespiratory Shortness of breath: present, Cough: absent   Cardivascular Chest pain: absent, Leg swelling :present   GI Constipation: present, Diarrhea: present, Swallowing change: present    Urinary frequency: absent, Urinary urgency: absent, Urinary incontinence: absent   Musculoskeletal Neck pain: present, Back pain: present, Stiffness: present, Muscle pain: present, Joint pain: present   Dermatological Hair loss: absent, Skin changes: absent   Neurological Memory loss: absent, Confusion: absent, Seizures: present; Trouble walking or imbalance: present, Dizziness: absent, Weakness: present, Numbness present, Tremor: absent, Spasm: present, Speech difficulty: present, Headache: absent, Light sensitivity: absent   Psychiatric Anxiety: present, Depression  present, Suicidal ideations absent   Hematologic Abnormal bleeding: absent, Anemia: absent, Clotting disorder: absent, Lymph gland changes: absent

## 2019-05-29 ENCOUNTER — OFFICE VISIT (OUTPATIENT)
Dept: FAMILY MEDICINE CLINIC | Age: 59
End: 2019-05-29
Payer: MEDICAID

## 2019-05-29 VITALS
OXYGEN SATURATION: 98 % | SYSTOLIC BLOOD PRESSURE: 100 MMHG | DIASTOLIC BLOOD PRESSURE: 70 MMHG | TEMPERATURE: 97.7 F | HEART RATE: 93 BPM | BODY MASS INDEX: 20.01 KG/M2 | RESPIRATION RATE: 16 BRPM | WEIGHT: 109.4 LBS

## 2019-05-29 DIAGNOSIS — R20.2 NUMBNESS AND TINGLING IN BOTH HANDS: Primary | ICD-10-CM

## 2019-05-29 DIAGNOSIS — Z86.73 HISTORY OF STROKE: ICD-10-CM

## 2019-05-29 DIAGNOSIS — G56.21 ULNAR NEUROPATHY OF RIGHT UPPER EXTREMITY: ICD-10-CM

## 2019-05-29 DIAGNOSIS — R20.0 NUMBNESS AND TINGLING IN BOTH HANDS: Primary | ICD-10-CM

## 2019-05-29 DIAGNOSIS — R94.31 ABNORMAL EKG: ICD-10-CM

## 2019-05-29 DIAGNOSIS — G40.309 GENERALIZED CONVULSIVE EPILEPSY WITHOUT INTRACTABLE EPILEPSY (HCC): ICD-10-CM

## 2019-05-29 DIAGNOSIS — R01.1 MURMUR, CARDIAC: ICD-10-CM

## 2019-05-29 DIAGNOSIS — G56.01 CARPAL TUNNEL SYNDROME OF RIGHT WRIST: ICD-10-CM

## 2019-05-29 DIAGNOSIS — R59.1 LYMPHADENOPATHY: ICD-10-CM

## 2019-05-29 PROCEDURE — G8420 CALC BMI NORM PARAMETERS: HCPCS | Performed by: NURSE PRACTITIONER

## 2019-05-29 PROCEDURE — 1036F TOBACCO NON-USER: CPT | Performed by: NURSE PRACTITIONER

## 2019-05-29 PROCEDURE — 99213 OFFICE O/P EST LOW 20 MIN: CPT | Performed by: NURSE PRACTITIONER

## 2019-05-29 PROCEDURE — G8427 DOCREV CUR MEDS BY ELIG CLIN: HCPCS | Performed by: NURSE PRACTITIONER

## 2019-05-29 PROCEDURE — 93000 ELECTROCARDIOGRAM COMPLETE: CPT | Performed by: NURSE PRACTITIONER

## 2019-05-29 PROCEDURE — 3017F COLORECTAL CA SCREEN DOC REV: CPT | Performed by: NURSE PRACTITIONER

## 2019-05-29 RX ORDER — LOSARTAN POTASSIUM 100 MG/1
100 TABLET ORAL DAILY
COMMUNITY
Start: 2018-11-28 | End: 2020-06-18 | Stop reason: SDUPTHER

## 2019-05-29 ASSESSMENT — ENCOUNTER SYMPTOMS
COUGH: 0
NAUSEA: 0
EYE REDNESS: 0
RHINORRHEA: 0
SORE THROAT: 0
CONSTIPATION: 0
DIARRHEA: 0
ABDOMINAL PAIN: 0
SHORTNESS OF BREATH: 1
EYE DISCHARGE: 0
VOICE CHANGE: 1
EYE ITCHING: 0

## 2019-05-29 NOTE — PROGRESS NOTES
Subjective:     HPI: Padmaja Alvarez is a 62 y.o. female who presents in office today with self for bilateral hand pain. Losing coordination/numbness/tingling in both hands. Onset 2-3 months ago and worsening. Had a stroke in October. Thought it was from this but it has been in her left hand too and her stroke issues were on her right side. Patient thinks possibly arthritis unless related to stroke. Feels tingling, almost if a foot fell asleep and came back. Cannot feel pain on her right side. Left is just tingling but can still feel. Had an EMG performed on the right side a long time ago. Showed ulnar neuropathy. Hasn't had left side done. No seizures recently. No adjustment by neurology a few weeks ago to seizure medications. Can't tell if worse in AM or PM. Woke up in the middle of night and really felt it. Used to work with her hands a lot, computer work. No other concerns right now. HPI    Review of Systems   Constitutional: Positive for fatigue. Negative for activity change, appetite change and fever. HENT: Positive for voice change (since vocal cancer, chronic). Negative for congestion, ear pain, postnasal drip, rhinorrhea and sore throat. Eyes: Negative for discharge, redness and itching. Respiratory: Positive for shortness of breath (sometimes). Negative for cough. Cardiovascular: Negative for chest pain. Gastrointestinal: Negative for abdominal pain, constipation, diarrhea and nausea. Genitourinary: Negative for dysuria. Musculoskeletal: Positive for myalgias. Negative for arthralgias. Skin: Negative for rash. Neurological: Positive for weakness (slightly on right side since stroke in fall) and numbness. Negative for dizziness, seizures, light-headedness and headaches. Psychiatric/Behavioral: Negative for dysphoric mood and sleep disturbance. The patient is not nervous/anxious.       Patient Care Team:  ALIRIO Flores - CNP as PCP - General (Family Nurse Practitioner)  ALIRIO Karimi CNP as PCP - S Attributed Provider  Axel Wild MD as Consulting Physician (Gastroenterology)    Visit Information    Have you changed or started any medications since your last visit including any over-the-counter medicines, vitamins, or herbal medicines? yes - Med list updated   Are you having any side effects from any of your medications? -  no  Have you stopped taking any of your medications? Is so, why? -  no    Have you seen any other physician or provider since your last visit? Yes - Records Obtained Neurology  Have you had any other diagnostic tests since your last visit? No  Have you been seen in the emergency room and/or had an admission to a hospital since we last saw you? No  Have you had your routine dental cleaning in the past 6 months? No false teeth    Have you activated your Mobile [employer] account? If not, what are your barriers? Yes   If activated, Do you have the mobile scott and comfortable using functions?  No: Uses home computer     Medical History Review    Social History     Socioeconomic History    Marital status: Single     Spouse name: None    Number of children: None    Years of education: None    Highest education level: None   Occupational History    None   Social Needs    Financial resource strain: None    Food insecurity:     Worry: None     Inability: None    Transportation needs:     Medical: None     Non-medical: None   Tobacco Use    Smoking status: Former Smoker     Packs/day: 1.50     Years: 25.00     Pack years: 37.50     Types: Cigarettes     Start date: 1991     Last attempt to quit: 2016     Years since quittin.6    Smokeless tobacco: Never Used   Substance and Sexual Activity    Alcohol use: No     Alcohol/week: 0.0 oz    Drug use: Yes     Frequency: 5.0 times per week     Types: Marijuana     Comment: occasionally    Sexual activity: Never   Lifestyle    Physical activity:     Days per week: None     Minutes per session: None    Stress: None   Relationships    Social connections:     Talks on phone: None     Gets together: None     Attends [employer] service: None     Active member of club or organization: None     Attends meetings of clubs or organizations: None     Relationship status: None    Intimate partner violence:     Fear of current or ex partner: None     Emotionally abused: None     Physically abused: None     Forced sexual activity: None   Other Topics Concern    None   Social History Narrative    None     Past Medical History:   Diagnosis Date    Anemia     Anxiety     Arthritis     Colon polyp 10/04/2017    multi adenomas    COPD (chronic obstructive pulmonary disease) ([facility] [address]     G tube feedings ([facility] [address] 2/2016    every 3-4 hours    History of blood transfusion 1990    x 6 during child birth    Hypoglycemia     Insomnia     Seizures ([facility] [address]     Squamous cell carcinoma of vocal cord ([facility] [address] 11/17/2015    with history of radiation treatments       Past Medical, Family, and Social History reviewed and does not contribute to the patient presenting condition    Health Maintenance   Topic Date Due    HIV screen  06/01/1975    DTaP/Tdap/Td vaccine (1 - Tdap) 06/01/1979    Breast cancer screen  06/01/2010    Shingles Vaccine (1 of 2) 06/01/2010    Low dose CT lung screening  06/01/2015    Colon cancer screen colonoscopy  10/04/2018    Cervical cancer screen  11/23/2018    Flu vaccine (Season Ended) 11/03/2019 (Originally 9/1/2019)    Potassium monitoring  01/10/2020    Creatinine monitoring  01/10/2020    Lipid screen  01/10/2024    Hepatitis C screen  Completed    Pneumococcal 0-64 years Vaccine  Aged Out       [facility] 36 Scores 10/3/2018 6/16/2017 7/29/2016 4/12/2016 4/12/2016   PHQ2 Score 2 0 5 4 2   PHQ9 Score 2 0 14 12 2     Interpretation of Total Score DepressionSeverity: 1-4 = Minimal depression, 5-9 = Mild depression, 10-14 = Moderate depression, 15-19 = Moderately severe depression, 20-27 = Severe depression    Current Outpatient Medications   Medication Sig Dispense Refill    losartan (COZAAR) 100 MG tablet Take 100 mg by mouth daily      phenytoin (DILANTIN) 100 MG ER capsule TAKE TWO CAPSULES BY MOUTH EVERY MORNING AND TAKE THREE CAPSULES BY MOUTH EVERY NIGHT 150 capsule 6    amLODIPine (NORVASC) 2.5 MG tablet Take 2.5 mg by mouth daily      venlafaxine (EFFEXOR XR) 150 MG extended release capsule Take 1 capsule by mouth daily 30 capsule 5    traZODone (DESYREL) 50 MG tablet Take 2 tablets by mouth nightly 60 tablet 5    aspirin 81 MG chewable tablet Take 81 mg by mouth daily      clopidogrel (PLAVIX) 75 MG tablet Take 75 mg by mouth daily       No current facility-administered medications for this visit. Objective:     /70 (Site: Right Upper Arm, Position: Sitting, Cuff Size: Small Adult)   Pulse 93   Temp 97.7 °F (36.5 °C) (Tympanic)   Resp 16   Wt 109 lb 6.4 oz (49.6 kg)   SpO2 98%   BMI 20.01 kg/m²      Physical Exam   Constitutional: She is oriented to person, place, and time. She appears well-developed and well-nourished. She is active. No distress. HENT:   Head: Normocephalic and atraumatic. Right Ear: Tympanic membrane and external ear normal.   Left Ear: Tympanic membrane and external ear normal.   Nose: Nose normal.   Mouth/Throat: Uvula is midline and oropharynx is clear and moist. No oropharyngeal exudate. Hoarse voice, chronic   Eyes: Pupils are equal, round, and reactive to light. Right eye exhibits no discharge. Left eye exhibits no discharge. Neck: Normal carotid pulses present. Tracheal tenderness present. Carotid bruit is present (left side, likely translocated murmur). Cardiovascular: Normal rate and regular rhythm. Murmur heard. Systolic murmur is present with a grade of 3/6. Pulses:       Carotid pulses are 2+ on the right side, and 2+ on the left side. Radial pulses are 2+ on the right side, and 2+ on the left side.         Dorsalis pedis pulses are 2+ on the right side, and 2+ on the left side. Posterior tibial pulses are 2+ on the right side, and 2+ on the left side. Pulmonary/Chest: Effort normal and breath sounds normal. No respiratory distress. She has no decreased breath sounds. She has no wheezes. Abdominal: Soft. Bowel sounds are normal.   Musculoskeletal:   No visible red or swollen joints to bilateral upper and lower extremities. Lymphadenopathy:     She has cervical adenopathy (bilateral cervical dime sized adenopathy, very tender, non movable, also very large, 50 cent piece sized tosilar adenopathy bilaterally, very tender, non movable. ). Neurological: She is alert and oriented to person, place, and time. She has normal strength. Skin: Skin is warm, dry and intact. No rash noted. Psychiatric: She has a normal mood and affect. Her speech is normal and behavior is normal. Thought content normal.   Vitals reviewed. Assessment:      Diagnosis Orders   1. Numbness and tingling in both hands  EMG   2. Ulnar neuropathy of right upper extremity     3. Carpal tunnel syndrome of right wrist     4. Generalized convulsive epilepsy without intractable epilepsy ([facility] [address]     5. History of stroke     6. Murmur, cardiac  EKG 12 lead   7. Lymphadenopathy  CTA NECK W WO CONTRAST     Plan:     Return if symptoms worsen or fail to improve. New murmur today. Very loud. To continue with EKG today. EKG abnormal today. Atrial enlargement in past. Still present today. S/T abnormality today. ECHO to be completed. To call neurology. CT for neck due to swelling and lymph nodes and history of cancer issues. No red flags present today. Slow onset of symptoms. No need for emergent symptoms. EMG to be done bilaterally. Encouraged healthy diet and exercise. Call office with any new or worsening symptoms or concerns.      Jl perez received counseling on the following healthy behaviors: nutrition, exercise and medication adherence  Reviewed prior labs and health maintenance. Continue current medications, diet and exercise. Discussed use, benefit, and side effects of prescribed medications. Barriers to medication compliance addressed. Patient given educational materials - see patient instructions. All patient questions answered. Patient voiced understanding.          Electronically signed by ALIRIO Delgadillo CNP on 5/29/2019 at 2:14 PM

## 2019-06-13 ENCOUNTER — TELEPHONE (OUTPATIENT)
Dept: FAMILY MEDICINE CLINIC | Age: 59
End: 2019-06-13

## 2019-06-13 DIAGNOSIS — R59.1 LYMPHADENOPATHY: Primary | ICD-10-CM

## 2019-06-17 DIAGNOSIS — F51.04 PSYCHOPHYSIOLOGICAL INSOMNIA: ICD-10-CM

## 2019-06-17 RX ORDER — TRAZODONE HYDROCHLORIDE 50 MG/1
100 TABLET ORAL NIGHTLY
Qty: 60 TABLET | Refills: 5 | Status: SHIPPED | OUTPATIENT
Start: 2019-06-17 | End: 2019-12-06 | Stop reason: SDUPTHER

## 2019-06-17 NOTE — TELEPHONE ENCOUNTER
disorder, single episode, moderate (HCC)     Ulnar neuropathy of right upper extremity     Carpal tunnel syndrome of right wrist     Cancer of supraglottis (HCC)     Convulsions (HCC)     [facility]ized convulsive epilepsy without intractable [facility]     Subtherapeutic phenytoin level     Positive FIT (fecal immunochemical test)     Herniated lumbar intervertebral disc     Chronic back pain     Colon polyp     Gait disturbance, post-stroke     Right sided weakness     Aneurysm (HCC)     Cerebral aneurysm, nonruptured     Intracerebral hemorrhage (HCC)     Anemia     History of stroke

## 2019-07-02 ENCOUNTER — HOSPITAL ENCOUNTER (OUTPATIENT)
Dept: NON INVASIVE DIAGNOSTICS | Age: 59
Discharge: HOME | End: 2019-07-02
Payer: MEDICAID

## 2019-07-02 ENCOUNTER — HOSPITAL ENCOUNTER (OUTPATIENT)
Dept: CT IMAGING | Age: 59
Discharge: HOME | End: 2019-07-04
Payer: MEDICAID

## 2019-07-02 DIAGNOSIS — R01.1 MURMUR, CARDIAC: ICD-10-CM

## 2019-07-02 DIAGNOSIS — R94.31 ABNORMAL EKG: ICD-10-CM

## 2019-07-02 DIAGNOSIS — R59.1 LYMPHADENOPATHY: ICD-10-CM

## 2019-07-02 LAB
LV EF: 55 %
LVEF MODALITY: NORMAL

## 2019-07-02 PROCEDURE — 2580000003 HC RX 258: Performed by: NURSE PRACTITIONER

## 2019-07-02 PROCEDURE — 6360000004 HC RX CONTRAST MEDICATION: Performed by: NURSE PRACTITIONER

## 2019-07-02 PROCEDURE — 70491 CT SOFT TISSUE NECK W/DYE: CPT

## 2019-07-02 PROCEDURE — 93306 TTE W/DOPPLER COMPLETE: CPT

## 2019-07-02 RX ORDER — 0.9 % SODIUM CHLORIDE 0.9 %
80 INTRAVENOUS SOLUTION INTRAVENOUS ONCE
Status: COMPLETED | OUTPATIENT
Start: 2019-07-02 | End: 2019-07-02

## 2019-07-02 RX ORDER — SODIUM CHLORIDE 0.9 % (FLUSH) 0.9 %
10 SYRINGE (ML) INJECTION PRN
Status: DISCONTINUED | OUTPATIENT
Start: 2019-07-02 | End: 2019-07-05 | Stop reason: HOSPADM

## 2019-07-02 RX ADMIN — Medication 10 ML: at 13:59

## 2019-07-02 RX ADMIN — IOPAMIDOL 75 ML: 755 INJECTION, SOLUTION INTRAVENOUS at 13:59

## 2019-07-02 RX ADMIN — SODIUM CHLORIDE 80 ML: 9 INJECTION, SOLUTION INTRAVENOUS at 14:00

## 2019-07-15 DIAGNOSIS — F32.1 MAJOR DEPRESSIVE DISORDER, SINGLE EPISODE, MODERATE (HCC): Chronic | ICD-10-CM

## 2019-07-15 DIAGNOSIS — F41.1 GENERALIZED ANXIETY DISORDER: ICD-10-CM

## 2019-07-16 RX ORDER — VENLAFAXINE HYDROCHLORIDE 150 MG/1
150 CAPSULE, EXTENDED RELEASE ORAL DAILY
Qty: 30 CAPSULE | Refills: 5 | Status: SHIPPED | OUTPATIENT
Start: 2019-07-16 | End: 2020-01-31

## 2019-07-16 NOTE — TELEPHONE ENCOUNTER
LRF 1-10-19 # 30 RF 5  LOV 1-10-19  RTO if symptoms worsen or fail to improve    Health Maintenance   Topic Date Due    HIV screen  06/01/1975    DTaP/Tdap/Td vaccine (1 - Tdap) 06/01/1979    Breast cancer screen  06/01/2010    Shingles Vaccine (1 of 2) 06/01/2010    Low dose CT lung screening  06/01/2015    Colon cancer screen colonoscopy  10/04/2018    Cervical cancer screen  11/23/2018    Flu vaccine (1) 11/03/2019 (Originally 9/1/2019)    Potassium monitoring  01/10/2020    Creatinine monitoring  01/10/2020    Lipid screen  01/10/2024    Hepatitis C screen  Completed    Pneumococcal 0-64 years Vaccine  Aged Out             (applicable per patient's age: Cancer Screenings, Depression Screening, Fall Risk Screening, Immunizations)    LDL Cholesterol (mg/dL)   Date Value   01/10/2019 149 (H)     AST (U/L)   Date Value   01/10/2019 12     ALT (U/L)   Date Value   01/10/2019 7     BUN (mg/dL)   Date Value   01/10/2019 11      (goal A1C is < 7)   (goal LDL is <100) need 30-50% reduction from baseline     BP Readings from Last 3 Encounters:   05/29/19 100/70   04/16/19 128/80   01/10/19 114/76    (goal /80)      All Future Testing planned in [facility]:  Lab Frequency Next Occurrence   CTA HEAD W CONTRAST Once 08/08/2019   Iron And TIBC Once 01/11/2020   Lipid Panel Once 01/11/2020   Phenytoin Level, Total and Free Once 04/17/2019   EMG Once 05/29/2019       Next Visit Date:  Future Appointments   Date Time Provider Lillie Moncada   7/23/2019 11:00 AM Roro Guzman MD 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)     Seizures ([address]     Anxiety     Depression     Hypoglycemia     Major depressive disorder, single episode, moderate (HCC)     Ulnar neuropathy of right upper extremity     Carpal tunnel syndrome of right wrist     Cancer of supraglottis ([address]     Convulsions ([address]     Generalized convulsive epilepsy without

## 2019-07-23 ENCOUNTER — OFFICE VISIT (OUTPATIENT)
Dept: NEUROLOGY | Age: 59
End: 2019-07-23
Payer: MEDICAID

## 2019-07-23 VITALS
WEIGHT: 110 LBS | SYSTOLIC BLOOD PRESSURE: 134 MMHG | HEART RATE: 89 BPM | HEIGHT: 62 IN | BODY MASS INDEX: 20.24 KG/M2 | DIASTOLIC BLOOD PRESSURE: 82 MMHG

## 2019-07-23 DIAGNOSIS — R20.2 NUMBNESS AND TINGLING IN BOTH HANDS: Primary | ICD-10-CM

## 2019-07-23 DIAGNOSIS — G40.309 GENERALIZED CONVULSIVE EPILEPSY WITHOUT INTRACTABLE EPILEPSY (HCC): ICD-10-CM

## 2019-07-23 DIAGNOSIS — Z86.79 HISTORY OF INTRACRANIAL HEMORRHAGE: ICD-10-CM

## 2019-07-23 DIAGNOSIS — R20.0 NUMBNESS AND TINGLING IN BOTH HANDS: Primary | ICD-10-CM

## 2019-07-23 DIAGNOSIS — I72.5 BASILAR ARTERY ANEURYSM (HCC): ICD-10-CM

## 2019-07-23 PROCEDURE — G8427 DOCREV CUR MEDS BY ELIG CLIN: HCPCS | Performed by: PSYCHIATRY & NEUROLOGY

## 2019-07-23 PROCEDURE — G8420 CALC BMI NORM PARAMETERS: HCPCS | Performed by: PSYCHIATRY & NEUROLOGY

## 2019-07-23 PROCEDURE — 3017F COLORECTAL CA SCREEN DOC REV: CPT | Performed by: PSYCHIATRY & NEUROLOGY

## 2019-07-23 PROCEDURE — 99214 OFFICE O/P EST MOD 30 MIN: CPT | Performed by: PSYCHIATRY & NEUROLOGY

## 2019-07-23 PROCEDURE — 1036F TOBACCO NON-USER: CPT | Performed by: PSYCHIATRY & NEUROLOGY

## 2019-07-23 RX ORDER — PHENYTOIN SODIUM 100 MG/1
CAPSULE, EXTENDED RELEASE ORAL
Qty: 150 CAPSULE | Refills: 6 | Status: SHIPPED | OUTPATIENT
Start: 2019-07-23 | End: 2020-02-20 | Stop reason: SDUPTHER

## 2019-07-23 NOTE — PROGRESS NOTES
epilepsy (younger brother and maternal uncle)  Comorbid vocal cord carcinoma status post radiation therapy (March 2016); COPD; Arthritis. Follow-up in 3 months or sooner for further questions or concerns. Please note that portions of this note were completed with a voice recognition program.  Although every effort was made to ensure the accuracy of this  automated transcription, some errors in transcription may have occurred, occasionally words and mis-transcribed.

## 2019-09-09 ENCOUNTER — OFFICE VISIT (OUTPATIENT)
Dept: NEUROLOGY | Age: 59
End: 2019-09-09
Payer: MEDICAID

## 2019-09-09 DIAGNOSIS — R20.0 NUMBNESS AND TINGLING IN BOTH HANDS: Primary | ICD-10-CM

## 2019-09-09 DIAGNOSIS — R20.2 NUMBNESS AND TINGLING IN BOTH HANDS: Primary | ICD-10-CM

## 2019-09-09 PROCEDURE — 95910 NRV CNDJ TEST 7-8 STUDIES: CPT | Performed by: PSYCHIATRY & NEUROLOGY

## 2019-09-09 PROCEDURE — 95886 MUSC TEST DONE W/N TEST COMP: CPT | Performed by: PSYCHIATRY & NEUROLOGY

## 2019-09-09 NOTE — PROGRESS NOTES
normal distal latency, amplitude and conduction velocity. There is no significant velocity drop across left elbow segment. 6. Left palmar mixed sensory potential demonstrated increased latency difference among median and ulnar sensory potentials when stimulated across the wrist at equal distance. 7. Left median versus radial sensory study attempted but stopped at patient's request.  8. Left ulnar sensory nerve potential is not done at patient's request.  9. Needle recording using monopolar needle was performed in a sampling of C5-T1 innervated limb and paraspinal muscles in left upper extremity. This needle study demonstrated normal-appearing motor units without any abnormal spontaneous activity in the muscles tested as stated above. Paraspinal muscles are not studied at patient's request.       Electrodiagnostic interpretation:   There is electrophysiologic evidence of moderately severe left median sensory neuropathy at or distal to the left wrist compatible with left carpal tunnel syndrome of moderately severe intensity.  This study also demonstrated mild left ulnar neuropathy of non-localizable nature.  The study did not demonstrate any electrodiagnostic evidence of cervical radiculopathy or brachial plexopathy in the nerves and muscles tested. Please feel free to contact me should you have any questions or concerns regarding results of any of the above studies. Again, thank you for referring this patient and for allowing me to participate in the care of your patient. With sincere appreciation,      _______________________  Kalyani Medrano M.D.   Board Certified Neurologist  Dictated, but not edited    Greer Pang :-  *EMG: electromyography *NCS: nerve conduction study*FDI : first dorsal Interosseous *ADM : abductor digiti minimi *APB : abductor pollicis brevis *FCU : flexor corpi ulnaris  *EIP : extensor indicis pollicis  *EDC : extensor digitorum communis *ECR : extensor carpi radialis *Rect

## 2019-09-25 ENCOUNTER — TELEPHONE (OUTPATIENT)
Dept: NEUROLOGY | Age: 59
End: 2019-09-25

## 2019-09-26 DIAGNOSIS — R20.0 NUMBNESS AND TINGLING IN BOTH HANDS: ICD-10-CM

## 2019-09-26 DIAGNOSIS — R20.2 NUMBNESS AND TINGLING IN BOTH HANDS: ICD-10-CM

## 2019-09-26 DIAGNOSIS — G56.02 CARPAL TUNNEL SYNDROME OF LEFT WRIST: Primary | ICD-10-CM

## 2019-09-26 DIAGNOSIS — G56.01 CARPAL TUNNEL SYNDROME OF RIGHT WRIST: ICD-10-CM

## 2019-09-26 NOTE — TELEPHONE ENCOUNTER
[facility] returned my call. I let her know that Dr. Ramandeep Abdi referred her to Dr. Abdirizak Monahan. I gave her the phone number to schedule an appointment.

## 2019-09-26 NOTE — TELEPHONE ENCOUNTER
Please let the patient know that we can refer her to orthopods.    I will put in the request.   Thank you.   -dr. Red Gavin

## 2019-09-26 NOTE — TELEPHONE ENCOUNTER
A call was placed to the patient. It went to voice mail. A message was left asking for a return call.

## 2019-10-23 ENCOUNTER — OFFICE VISIT (OUTPATIENT)
Dept: ORTHOPEDIC SURGERY | Age: 59
End: 2019-10-23
Payer: MEDICAID

## 2019-10-23 VITALS — WEIGHT: 110.01 LBS | BODY MASS INDEX: 20.24 KG/M2 | HEIGHT: 62 IN

## 2019-10-23 DIAGNOSIS — G56.02 LEFT CARPAL TUNNEL SYNDROME: ICD-10-CM

## 2019-10-23 DIAGNOSIS — R20.0 NUMBNESS AND TINGLING IN LEFT HAND: Primary | ICD-10-CM

## 2019-10-23 DIAGNOSIS — R20.2 NUMBNESS AND TINGLING IN LEFT HAND: Primary | ICD-10-CM

## 2019-10-23 DIAGNOSIS — G56.01 CARPAL TUNNEL SYNDROME ON RIGHT: ICD-10-CM

## 2019-10-23 PROCEDURE — 99243 OFF/OP CNSLTJ NEW/EST LOW 30: CPT | Performed by: ORTHOPAEDIC SURGERY

## 2019-10-23 PROCEDURE — G8427 DOCREV CUR MEDS BY ELIG CLIN: HCPCS | Performed by: ORTHOPAEDIC SURGERY

## 2019-10-23 PROCEDURE — G8484 FLU IMMUNIZE NO ADMIN: HCPCS | Performed by: ORTHOPAEDIC SURGERY

## 2019-10-23 PROCEDURE — G8420 CALC BMI NORM PARAMETERS: HCPCS | Performed by: ORTHOPAEDIC SURGERY

## 2019-10-24 DIAGNOSIS — R20.2 NUMBNESS AND TINGLING IN LEFT HAND: Primary | ICD-10-CM

## 2019-10-24 DIAGNOSIS — R20.0 NUMBNESS AND TINGLING IN LEFT HAND: Primary | ICD-10-CM

## 2019-10-25 ASSESSMENT — ENCOUNTER SYMPTOMS
BACK PAIN: 0
WHEEZING: 0
SHORTNESS OF BREATH: 0

## 2019-11-04 ENCOUNTER — TELEPHONE (OUTPATIENT)
Dept: VASCULAR SURGERY | Age: 59
End: 2019-11-04

## 2019-11-05 DIAGNOSIS — R20.2 NUMBNESS AND TINGLING IN LEFT HAND: ICD-10-CM

## 2019-11-05 DIAGNOSIS — G56.01 CARPAL TUNNEL SYNDROME ON RIGHT: Primary | ICD-10-CM

## 2019-11-05 DIAGNOSIS — R20.0 NUMBNESS AND TINGLING IN LEFT HAND: ICD-10-CM

## 2019-11-18 ENCOUNTER — HOSPITAL ENCOUNTER (OUTPATIENT)
Dept: VASCULAR LAB | Facility: CLINIC | Age: 59
Discharge: HOME | End: 2019-11-18
Payer: MEDICAID

## 2019-11-18 DIAGNOSIS — R20.2 NUMBNESS AND TINGLING IN LEFT HAND: ICD-10-CM

## 2019-11-18 DIAGNOSIS — R20.0 NUMBNESS AND TINGLING IN LEFT HAND: ICD-10-CM

## 2019-11-18 PROCEDURE — 93923 UPR/LXTR ART STDY 3+ LVLS: CPT

## 2019-12-02 ENCOUNTER — INITIAL CONSULT (OUTPATIENT)
Dept: VASCULAR SURGERY | Age: 59
End: 2019-12-02
Payer: MEDICAID

## 2019-12-02 VITALS
DIASTOLIC BLOOD PRESSURE: 78 MMHG | WEIGHT: 117.8 LBS | BODY MASS INDEX: 21.68 KG/M2 | HEIGHT: 62 IN | HEART RATE: 92 BPM | OXYGEN SATURATION: 94 % | SYSTOLIC BLOOD PRESSURE: 134 MMHG

## 2019-12-02 DIAGNOSIS — R20.0 BILATERAL HAND NUMBNESS: Primary | ICD-10-CM

## 2019-12-02 PROCEDURE — G8420 CALC BMI NORM PARAMETERS: HCPCS | Performed by: SURGERY

## 2019-12-02 PROCEDURE — G8484 FLU IMMUNIZE NO ADMIN: HCPCS | Performed by: SURGERY

## 2019-12-02 PROCEDURE — 99243 OFF/OP CNSLTJ NEW/EST LOW 30: CPT | Performed by: SURGERY

## 2019-12-02 PROCEDURE — G8427 DOCREV CUR MEDS BY ELIG CLIN: HCPCS | Performed by: SURGERY

## 2019-12-02 RX ORDER — LOSARTAN POTASSIUM 100 MG/1
TABLET ORAL
COMMUNITY
Start: 2019-11-25 | End: 2020-03-17 | Stop reason: SDUPTHER

## 2019-12-08 ASSESSMENT — ENCOUNTER SYMPTOMS
CHEST TIGHTNESS: 0
EYES NEGATIVE: 1
SHORTNESS OF BREATH: 0
COLOR CHANGE: 0
ABDOMINAL PAIN: 0
ALLERGIC/IMMUNOLOGIC NEGATIVE: 1

## 2019-12-13 DIAGNOSIS — I10 ESSENTIAL HYPERTENSION: Primary | ICD-10-CM

## 2019-12-13 RX ORDER — AMLODIPINE BESYLATE 2.5 MG/1
2.5 TABLET ORAL DAILY
Qty: 90 TABLET | Refills: 3 | Status: SHIPPED | OUTPATIENT
Start: 2019-12-13 | End: 2020-12-16 | Stop reason: SDUPTHER

## 2020-01-09 ENCOUNTER — OFFICE VISIT (OUTPATIENT)
Dept: FAMILY MEDICINE CLINIC | Age: 60
End: 2020-01-09
Payer: MEDICAID

## 2020-01-09 VITALS
SYSTOLIC BLOOD PRESSURE: 116 MMHG | TEMPERATURE: 97.5 F | DIASTOLIC BLOOD PRESSURE: 76 MMHG | WEIGHT: 119.8 LBS | OXYGEN SATURATION: 97 % | RESPIRATION RATE: 20 BRPM | HEART RATE: 95 BPM | BODY MASS INDEX: 21.91 KG/M2

## 2020-01-09 PROCEDURE — G8484 FLU IMMUNIZE NO ADMIN: HCPCS | Performed by: NURSE PRACTITIONER

## 2020-01-09 PROCEDURE — 3017F COLORECTAL CA SCREEN DOC REV: CPT | Performed by: NURSE PRACTITIONER

## 2020-01-09 PROCEDURE — 1036F TOBACCO NON-USER: CPT | Performed by: NURSE PRACTITIONER

## 2020-01-09 PROCEDURE — G8420 CALC BMI NORM PARAMETERS: HCPCS | Performed by: NURSE PRACTITIONER

## 2020-01-09 PROCEDURE — G0296 VISIT TO DETERM LDCT ELIG: HCPCS | Performed by: NURSE PRACTITIONER

## 2020-01-09 PROCEDURE — G8427 DOCREV CUR MEDS BY ELIG CLIN: HCPCS | Performed by: NURSE PRACTITIONER

## 2020-01-09 PROCEDURE — 99214 OFFICE O/P EST MOD 30 MIN: CPT | Performed by: NURSE PRACTITIONER

## 2020-01-09 RX ORDER — TRAZODONE HYDROCHLORIDE 50 MG/1
100 TABLET ORAL NIGHTLY
Qty: 60 TABLET | Refills: 0 | Status: CANCELLED | OUTPATIENT
Start: 2020-01-09 | End: 2021-01-09

## 2020-01-09 RX ORDER — TRAZODONE HYDROCHLORIDE 100 MG/1
100 TABLET ORAL NIGHTLY
Qty: 90 TABLET | Refills: 1 | Status: SHIPPED | OUTPATIENT
Start: 2020-01-09 | End: 2020-08-27 | Stop reason: SDUPTHER

## 2020-01-09 ASSESSMENT — ENCOUNTER SYMPTOMS
VOICE CHANGE: 1
DIARRHEA: 0
SORE THROAT: 0
RHINORRHEA: 0
ABDOMINAL PAIN: 0
SHORTNESS OF BREATH: 1
COUGH: 0
EYE REDNESS: 0
CONSTIPATION: 0
NAUSEA: 0
EYE ITCHING: 0
EYE DISCHARGE: 0

## 2020-01-09 ASSESSMENT — PATIENT HEALTH QUESTIONNAIRE - PHQ9
SUM OF ALL RESPONSES TO PHQ9 QUESTIONS 1 & 2: 1
2. FEELING DOWN, DEPRESSED OR HOPELESS: 0
1. LITTLE INTEREST OR PLEASURE IN DOING THINGS: 1
SUM OF ALL RESPONSES TO PHQ QUESTIONS 1-9: 1
SUM OF ALL RESPONSES TO PHQ QUESTIONS 1-9: 1

## 2020-01-09 NOTE — PROGRESS NOTES
History of Present Illness:     Julius Schwab is a 61 y.o. female who presents in office today with Self   follow up on chronic conditions including: Follows with neurology. Neurosurgery also. Otherwise doing okay. Taking all medications as prescribed. Mood has been good. Hands acting up, told she has carpel tunnel, thinks she did EMG but no results in system. 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 ([address]    Subtherapeutic phenytoin level    Positive FIT (fecal immunochemical test)    Herniated lumbar intervertebral disc    Chronic back pain    Colon polyp    Gait disturbance, post-stroke    Right sided weakness    Aneurysm (HCC)    Cerebral aneurysm, nonruptured    Intracerebral hemorrhage (HCC)    Anemia    History of stroke       HPI    Patient Care Team:  ALIRIO Coughlin CNP as PCP - General (Nurse Practitioner)  [place] CNP as PCP - [facility] Empaneled Provider  Pierre Dinero MD as Consulting Physician (Gastroenterology)    Visit Information    Have you changed or started any medications since your last visit including any over-the-counter medicines, vitamins, or herbal medicines? no   Are you having any side effects from any of your medications? -  no  Have you stopped taking any of your medications? Is so, why? -  no  Have you seen any other physician or provider since your last visit? Yes - Records Obtained Ortho, Vascular, Neuro,   Have you had any other diagnostic tests since your last visit?  Yes - Records Obtained  Have you been seen in the emergency room and/or had an admission to [facility] (H) 01/10/2019    (goal LDL reduction with dx if diabetes is 50% LDL reduction)      PHQ Scores 1/9/2020 10/3/2018 6/16/2017 7/29/2016 4/12/2016 4/12/2016   PHQ2 Score 1 2 0 5 4 2   PHQ9 Score 1 2 0 14 12 2     Interpretation of Total Score Depression Severity: 1-4 = Minimal depression, 5-9 = Mild depression, 10-14 = Moderate depression, 15-19 = Moderately severe depression, 20-27 = Severe depression     Return in about 6 months (around 7/9/2020), or if symptoms worsen or fail to improve, for Chronic Conditions, Re-Check. \"There is beauty in all things if you choose to see it\"    General Santos, TEODORO, APRN-[facility] [address] [facility] in [facility]  [email or web address]   Office: [phone]   Cell: [phone]    Electronically signed by ALIRIO Kitchen CNP on 1/9/2020 at 11:48 AM  Low Dose CT (LDCT) Lung Screening criteria met   Age 50-69   Pack year smoking >30   Still smoking or less than 15 year since quit   No sign or symptoms of lung cancer   > 11 months since last LDCT     Risks and benefits of lung cancer screening with LDCT scans discussed:    Significance of positive screen - False-positive LDCT results often occur. 95% of all positive results do not lead to a diagnosis of cancer. Usually further imaging can resolve most false-positive results; however, some patients may require invasive procedures. Over diagnosis risk - 10% to 12% of screen-detected lung cancer cases are over diagnosed--that is, the cancer would not have been detected in the patient's lifetime without the screening. Need for follow up screens annually to continue lung cancer screening effectiveness     Risks associated with radiation from annual LDCT- Radiation exposure is about the same as for a mammogram, which is about 1/3 of the annual background radiation exposure from everyday life.   Starting screening at age 54 is not likely to increase cancer risk from radiation exposure. Patients with comorbidities resulting in life expectancy of < 10 years, or that would preclude treatment of an abnormality identified on CT, should not be screened due to lack of benefit.     To obtain maximal benefit from this screening, smoking cessation and long-term abstinence from smoking is critical

## 2020-01-31 NOTE — TELEPHONE ENCOUNTER
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

## 2020-02-01 RX ORDER — VENLAFAXINE HYDROCHLORIDE 150 MG/1
150 CAPSULE, EXTENDED RELEASE ORAL DAILY
Qty: 30 CAPSULE | Refills: 5 | Status: SHIPPED | OUTPATIENT
Start: 2020-02-01 | End: 2020-08-20 | Stop reason: SDUPTHER

## 2020-02-20 RX ORDER — PHENYTOIN SODIUM 100 MG/1
CAPSULE, EXTENDED RELEASE ORAL
Qty: 150 CAPSULE | Refills: 0 | Status: SHIPPED | OUTPATIENT
Start: 2020-02-20 | End: 2020-03-16 | Stop reason: SDUPTHER

## 2020-03-05 RX ORDER — CLOPIDOGREL BISULFATE 75 MG/1
75 TABLET ORAL DAILY
Qty: 90 TABLET | Refills: 1 | Status: SHIPPED | OUTPATIENT
Start: 2020-03-05 | End: 2020-08-27 | Stop reason: SDUPTHER

## 2020-03-05 NOTE — TELEPHONE ENCOUNTER
LOV 1/9/20  LRF Never filled by our office, Patient states that her Neurosurg stated her PCP should fill.  Patient is out of medication  RTO 6 months    Health Maintenance   Topic Date Due    Breast cancer screen  06/01/2010    Low dose CT lung screening  06/01/2015    Colon cancer screen colonoscopy  10/04/2018    Cervical cancer screen  11/23/2018    Potassium monitoring  01/10/2020    Creatinine monitoring  01/10/2020    DTaP/Tdap/Td vaccine (1 - Tdap) 01/09/2021 (Originally 6/1/1971)    Flu vaccine (1) 01/09/2021 (Originally 9/1/2019)    Shingles Vaccine (1 of 2) 01/09/2021 (Originally 6/1/2010)    HIV screen  01/09/2021 (Originally 6/1/1975)    Lipid screen  01/10/2024    Hepatitis C screen  Completed    Hepatitis A vaccine  Aged Out    Hepatitis B vaccine  Aged Out    Hib vaccine  Aged Out    Meningococcal (ACWY) vaccine  Aged Out    Pneumococcal 0-64 years Vaccine  Aged Out             (applicable per patient's age: Cancer Screenings, Depression Screening, Fall Risk Screening, Immunizations)    LDL Cholesterol (mg/dL)   Date Value   01/10/2019 149 (H)     AST (U/L)   Date Value   01/10/2019 12     ALT (U/L)   Date Value   01/10/2019 7     BUN (mg/dL)   Date Value   01/10/2019 11      (goal A1C is < 7)   (goal LDL is <100) need 30-50% reduction from baseline     BP Readings from Last 3 Encounters:   01/09/20 116/76   12/02/19 134/78   07/23/19 134/82    (goal /80)      All Future Testing planned in [facility]:  Lab Frequency Next Occurrence   EMG Once 09/02/2019   Phenytoin Level, Total and Free Once 10/11/2019   CT Lung Screen (Annual) Once 01/09/2020   XR HAND RIGHT (MIN 3 VIEWS) Once 01/09/2020   XR HAND LEFT (MIN 3 VIEWS) Once 01/09/2020   CBC Auto Differential Once 01/09/2020   Comprehensive Metabolic Panel, Fasting Once 01/09/2020   Lipid, Fasting Once 01/09/2020   TSH with Reflex Once 01/09/2020   DARY DIGITAL SCREEN W CAD BILATERAL Once 02/08/2020       Next Visit Date:  Future

## 2020-03-16 RX ORDER — PHENYTOIN SODIUM 100 MG/1
CAPSULE, EXTENDED RELEASE ORAL
Qty: 150 CAPSULE | Refills: 0 | Status: SHIPPED | OUTPATIENT
Start: 2020-03-16 | End: 2020-04-21 | Stop reason: SDUPTHER

## 2020-03-17 RX ORDER — LOSARTAN POTASSIUM 100 MG/1
100 TABLET ORAL DAILY
Qty: 30 TABLET | Refills: 1 | Status: SHIPPED | OUTPATIENT
Start: 2020-03-17 | End: 2020-05-26

## 2020-03-17 NOTE — TELEPHONE ENCOUNTER
Last visit: 1/9/2020  Last Med refill: 112/2/2019  Does patient have enough medication for 72 hours: No    Next Visit Date:  Future Appointments   Date Time Provider Lillie Moncada   4/14/2020 10:20 AM Braxton Triplett MD Neuro Spec [address] Maintenance   Topic Date Due    Breast cancer screen  06/01/2010    Low dose CT lung screening  06/01/2015    Colon cancer screen colonoscopy  10/04/2018    Cervical cancer screen  11/23/2018    Potassium monitoring  01/10/2020    Creatinine monitoring  01/10/2020    DTaP/Tdap/Td vaccine (1 - Tdap) 01/09/2021 (Originally 6/1/1979)    Flu vaccine (1) 01/09/2021 (Originally 9/1/2019)    Shingles Vaccine (1 of 2) 01/09/2021 (Originally 6/1/2010)    HIV screen  01/09/2021 (Originally 6/1/1975)    Lipid screen  01/10/2024    Hepatitis C screen  Completed    Hepatitis A vaccine  Aged Out    Hepatitis B vaccine  Aged Out    Hib vaccine  Aged Out    Meningococcal (ACWY) vaccine  Aged Out    Pneumococcal 0-64 years Vaccine  Aged Out       No results found for: LABA1C          ( goal A1C is < 7)   No results found for: LABMICR  LDL Cholesterol (mg/dL)   Date Value   01/10/2019 149 (H)   04/22/2016 94       (goal LDL is <100)   AST (U/L)   Date Value   01/10/2019 12     ALT (U/L)   Date Value   01/10/2019 7     BUN (mg/dL)   Date Value   01/10/2019 11     BP Readings from Last 3 Encounters:   01/09/20 116/76   12/02/19 134/78   07/23/19 134/82          (goal 120/80)    All Future Testing planned in [facility]  Lab Frequency Next Occurrence   EMG Once 09/02/2019   Phenytoin Level, Total and Free Once 10/11/2019   CT Lung Screen (Annual) Once 01/09/2020   XR HAND RIGHT (MIN 3 VIEWS) Once 01/09/2020   XR HAND LEFT (MIN 3 VIEWS) Once 01/09/2020   CBC Auto Differential Once 01/09/2020   Comprehensive Metabolic Panel, Fasting Once 01/09/2020   Lipid, Fasting Once 01/09/2020   TSH with Reflex Once 01/09/2020   DARY DIGITAL SCREEN W CAD BILATERAL Once

## 2020-04-22 RX ORDER — PHENYTOIN SODIUM 100 MG/1
CAPSULE, EXTENDED RELEASE ORAL
Qty: 150 CAPSULE | Refills: 3 | Status: SHIPPED | OUTPATIENT
Start: 2020-04-22 | End: 2020-07-30 | Stop reason: SDUPTHER

## 2020-05-26 RX ORDER — LOSARTAN POTASSIUM 100 MG/1
TABLET ORAL
Qty: 30 TABLET | Refills: 0 | Status: SHIPPED | OUTPATIENT
Start: 2020-05-26 | End: 2020-05-28

## 2020-05-26 NOTE — TELEPHONE ENCOUNTER
Patient scheduled a VV with Dorinda AMEZQUITA CNP  For 5/28/2020 @ [id number]   Patient is out of medication.

## 2020-05-28 ENCOUNTER — TELEMEDICINE (OUTPATIENT)
Dept: FAMILY MEDICINE CLINIC | Age: 60
End: 2020-05-28
Payer: MEDICAID

## 2020-05-28 VITALS — WEIGHT: 115 LBS | BODY MASS INDEX: 21.16 KG/M2 | HEIGHT: 62 IN

## 2020-05-28 PROCEDURE — 99214 OFFICE O/P EST MOD 30 MIN: CPT | Performed by: NURSE PRACTITIONER

## 2020-05-28 ASSESSMENT — ENCOUNTER SYMPTOMS
CONSTIPATION: 0
SINUS PRESSURE: 0
VOMITING: 0
EYES NEGATIVE: 1
ABDOMINAL PAIN: 0
DIARRHEA: 0
SHORTNESS OF BREATH: 1
COUGH: 0
VOICE CHANGE: 1
RHINORRHEA: 0
WHEEZING: 0
BACK PAIN: 1
BLOOD IN STOOL: 0
SORE THROAT: 0
NAUSEA: 0

## 2020-05-28 NOTE — PROGRESS NOTES
[address] [facility]   [address]  [phone]      2020      TELEHEALTH EVALUATION -- Audio/Visual (During ETRZO-11 public health emergency)    Visit Information    Have you changed or started any medications since your last visit including any over-the-counter medicines, vitamins, or herbal medicines? no   Are you having any side effects from any of your medications? -  no  Have you stopped taking any of your medications? Is so, why? -  no    Have you seen any other physician or provider since your last visit? No  Have you had any other diagnostic tests since your last visit? No  Have you been seen in the emergency room and/or had an admission to a hospital since we last saw you? No  Have you had your routine dental cleaning in the past 6 months? no    Have you activated your [employer] account? If not, what are your barriers? Yes     Patient Care Team:  Kaleen Boxer, APRN - NP as PCP - General (Nurse Practitioner)  ALIRIO Boswell CNP as PCP - [facility]  Terrell Darnell MD as Consulting Physician (Gastroenterology)      Juan Jose (:  1960) has requested an audio/video evaluation for the following concern(s):    HPI:  Presents for virtual visit today for routine follow-up. Previously following with Claude Alcide, NP and then Maritza Rubin NP. Patient has a history of depression, anxiety, hypertension, cancer, chronic pain. Patient reports she feels okay, mood stable. Sleeping okay. Good appetite. Normal bowel and bladder pattern. She does have a concern regarding some swollen neck lymph nodes in her neck. She reports a are not resolving. This is concerning due to her history of cancer. Strongly recommended she call her oncologist and schedule follow-up appointment.   She did report that her previous oncologist retired and she  venlafaxine (EFFEXOR XR) 150 MG extended release capsule Take 1 capsule by mouth daily Do not crush or break. 30 capsule 5    traZODone (DESYREL) 100 MG tablet Take 1 tablet by mouth nightly 90 tablet 1    amLODIPine (NORVASC) 2.5 MG tablet Take 1 tablet by mouth daily 90 tablet 3    losartan (COZAAR) 100 MG tablet Take 100 mg by mouth daily      aspirin 81 MG chewable tablet Take 81 mg by mouth daily       No current facility-administered medications for this visit. Allergies   Allergen Reactions    Lisinopril Swelling       Subjective:  Review of Systems   Constitutional: Positive for fatigue. Negative for activity change, appetite change and fever. HENT: Positive for voice change (horaseness - comes and goes). Negative for congestion, ear pain, postnasal drip, rhinorrhea, sinus pressure and sore throat. Eyes: Negative. Respiratory: Positive for shortness of breath (a little). Negative for cough and wheezing. COPD   Cardiovascular: Negative for chest pain, palpitations and leg swelling. Gastrointestinal: Negative for abdominal pain, blood in stool, constipation, diarrhea, nausea and vomiting. Genitourinary: Positive for frequency (chronically). Negative for vaginal bleeding, vaginal discharge and vaginal pain. Postmenopausal   Musculoskeletal: Positive for back pain. Lower back pain chronically   Skin: Negative for rash and wound. Neurological: Positive for seizures (last one 20 years ago ). Negative for dizziness, syncope and headaches. Following with neurology / neurosurgery   Psychiatric/Behavioral: Positive for dysphoric mood and sleep disturbance (chronic troubles). Negative for self-injury and suicidal ideas. The patient is nervous/anxious. Mood is ok. Objective:  Physical Exam  Vitals signs and nursing note reviewed. Constitutional:       General: She is not in acute distress. HENT:      Head: Normocephalic and atraumatic.       Right Ear: Hearing and external ear normal.      Left Ear: Hearing and external ear normal.      Nose: Nose normal.      Mouth/Throat:      Lips: Pink. Mouth: Mucous membranes are moist.   Eyes:      Conjunctiva/sclera: Conjunctivae normal.      Pupils: Pupils are equal, round, and reactive to light. Neck:      Musculoskeletal: Normal range of motion. Trachea: Trachea normal.   Pulmonary:      Effort: Pulmonary effort is normal.      Breath sounds: Normal breath sounds. Skin:     Capillary Refill: Capillary refill takes less than 2 seconds. Coloration: Skin is pale. Neurological:      Mental Status: She is alert and oriented to person, place, and time. GCS: GCS eye subscore is 4. GCS verbal subscore is 5. GCS motor subscore is 6. Motor: Motor function is intact. Coordination: Coordination is intact. Psychiatric:         Attention and Perception: Attention normal.         Mood and Affect: Mood normal.         Speech: Speech normal.         Behavior: Behavior normal. Behavior is cooperative. Thought Content: Thought content normal.         Cognition and Memory: Cognition normal.         Judgment: Judgment normal.         Due to this being a TeleHealth encounter, evaluation of the following organ systems is limited: Vitals/Constitutional/EENT/Resp/CV/GI//MS/Neuro/Skin/Heme-Lymph-Imm. Assessment:  1. Major depressive disorder, single episode, moderate ([address]  2. Generalized anxiety disorder  Stable    3. Squamous cell carcinoma of vocal cord ([address]  4. Cancer of supraglottis ([address]  Concern for swollen lymph nodes in neck area  Encouraged to make appointment with oncologist    5. 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  Essential hypertension  Stable  Continue medications as ordered    Encouraged healthy diet  Encouraged daily exercise  Encouraged adequate fluid intake  Encouraged adequate rest  Strongly encouraged to follow-up with oncology  Monitor for increase in symptoms  Call office with any questions or concerns      Plan:  Return in about 3 months (around 8/28/2020) for routine follow up, medication follow up. An  electronic signature was used to authenticate this note. Communication:  Items for Patient/Writer/Staff to Accomplish  Discussed health maintenance. --ALIRIO Agosto - NP on 5/28/2020 at 2:35 PM19}    This is a telehealth visit that was performed with the originating site at Patient Location: [place]  and Provider Location of [place], [place]. Verbal consent to participate in video visit was obtained. Pursuant to the emergency declaration under the [facility], [facility] waiver authority and the [employer] Act, this Virtual Visit was conducted, with patient's consent, to reduce the patient's risk of exposure to COVID-19 and provide continuity of care for an established/new patient. Services were provided through a video synchronous discussion virtually to substitute for in-person clinic visit. I discussed with the patient the nature of our telehealth visits via interactive/real-time audio/video that:  - I would evaluate the patient and recommend diagnostics and treatments based on my assessment  - Our sessions are not being recorded and that personal health information is protected  - Our team would provide follow up care in person if/when the patient needs it. Services were provided through a video synchronous discussion virtually to substitute for in-person clinic visit.

## 2020-06-18 RX ORDER — LOSARTAN POTASSIUM 100 MG/1
100 TABLET ORAL DAILY
Qty: 90 TABLET | Refills: 1 | Status: SHIPPED | OUTPATIENT
Start: 2020-06-18 | End: 2020-12-15

## 2020-07-30 ENCOUNTER — OFFICE VISIT (OUTPATIENT)
Dept: NEUROLOGY | Age: 60
End: 2020-07-30
Payer: MEDICAID

## 2020-07-30 VITALS
SYSTOLIC BLOOD PRESSURE: 123 MMHG | TEMPERATURE: 97.1 F | DIASTOLIC BLOOD PRESSURE: 73 MMHG | WEIGHT: 115 LBS | HEART RATE: 86 BPM | BODY MASS INDEX: 21.16 KG/M2 | HEIGHT: 62 IN

## 2020-07-30 PROBLEM — I72.5 BASILAR ARTERY ANEURYSM (HCC): Status: ACTIVE | Noted: 2020-07-30

## 2020-07-30 PROBLEM — Z86.79 HISTORY OF INTRACRANIAL HEMORRHAGE: Status: ACTIVE | Noted: 2020-07-30

## 2020-07-30 PROCEDURE — 3017F COLORECTAL CA SCREEN DOC REV: CPT | Performed by: PSYCHIATRY & NEUROLOGY

## 2020-07-30 PROCEDURE — 99214 OFFICE O/P EST MOD 30 MIN: CPT | Performed by: PSYCHIATRY & NEUROLOGY

## 2020-07-30 PROCEDURE — G8427 DOCREV CUR MEDS BY ELIG CLIN: HCPCS | Performed by: PSYCHIATRY & NEUROLOGY

## 2020-07-30 PROCEDURE — G8420 CALC BMI NORM PARAMETERS: HCPCS | Performed by: PSYCHIATRY & NEUROLOGY

## 2020-07-30 PROCEDURE — 1036F TOBACCO NON-USER: CPT | Performed by: PSYCHIATRY & NEUROLOGY

## 2020-07-30 RX ORDER — PHENYTOIN SODIUM 100 MG/1
CAPSULE, EXTENDED RELEASE ORAL
Qty: 150 CAPSULE | Refills: 3 | Status: SHIPPED | OUTPATIENT
Start: 2020-07-30 | End: 2020-12-15

## 2020-07-30 NOTE — PROGRESS NOTES
NEUROLOGY FOLLOW-UP  Patient Name:  Amarjit Su  :   1960  Clinic Visit Date: 2020      I saw Ms. Amarjit Su in follow-up in the office today in continuation of neurologic care. She is a 61 y.o.  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 Today she comes to clinic stating that she has been trying to use wrist splint for left carpal tunnel syndrome. She does not want any referral to hand surgeon. She also states \"no seizures since last visit\". She is tolerating Dilantin well without any adverse effects. Regarding seizure disorder; she has been stable on Dilantin and her last seizure episode with LOC was in 2016. Of note; she had hx of vocal cord cancer Ulis Buerger ) s/p radiation therapy (completed 2016) was evaluated initially in 2016 and then has had EMG for right UE paresthesias  Since then, she has been using wrist splint and paresthesias had been stable. She is extremely hypophonic from vocal cord cancer related hoarseness. She has family history of seizures in her younger brother and maternal uncle. Review of systems done by staff reviewed and pertinent positives include numbness in left hand digits as above. Current Outpatient Medications on File Prior to Visit   Medication Sig Dispense Refill    losartan (COZAAR) 100 MG tablet Take 1 tablet by mouth daily 90 tablet 1    phenytoin (DILANTIN) 100 MG ER capsule TAKE TWO CAPSULES BY MOUTH EVERY MORNING AND TAKE THREE CAPSULES BY MOUTH EVERY NIGHT 150 capsule 3    clopidogrel (PLAVIX) 75 MG tablet Take 1 tablet by mouth daily 90 tablet 1    venlafaxine (EFFEXOR XR) 150 MG extended release capsule Take 1 capsule by mouth daily Do not crush or break.  30 capsule 5    traZODone (DESYREL) 100 MG tablet Take 1 tablet by mouth nightly 90 tablet 1    amLODIPine (NORVASC) 2.5 MG tablet Take 1 tablet by mouth daily 90 tablet 3    aspirin 81 MG chewable tablet Take 81 mg by mouth daily       No current facility-administered medications on file prior to visit. Allergies: Albino Ayers is allergic to lisinopril. Past Medical History:   Diagnosis Date    Anemia     Aneurysm ([facility] 09/2018    Anxiety     Arthritis     Cerebral artery occlusion with cerebral infarction ([facility] [address]     oct 8, 2019    Colon polyp 10/04/2017    multi adenomas    COPD (chronic obstructive pulmonary disease) ([facility] [address] [facility]     G tube feedings ([facility] [address] 2/2016    every 3-4 hours    History of blood transfusion 1990    x 6 during child birth    Hypoglycemia     Insomnia     Seizures ([address]     Squamous cell carcinoma of vocal cord ([facility] [address] 11/17/2015    with history of radiation treatments       Past Surgical History:   Procedure Laterality Date    BRAIN SURGERY  sept. 25, 2019    BRONCHOSCOPY  11/5/15    COLONOSCOPY  10/04/2017    multi polyps; poor prep    DENTAL SURGERY Bilateral     LARYNGOSCOPY Right 11/5/15    LUMBAR FUSION  02/07/2018    L3-S1    NERVE BLOCK  11/03/2017    caudal #1, decadron 10 mg    NERVE BLOCK  11/10/2017    caudal #2 decadron 10mg    NERVE BLOCK Left 12/22/2017    left transforaminal #1 decadron 10mg    HI COLONOSCOPY W/BIOPSY SINGLE/MULTIPLE  10/4/2017    COLONOSCOPY WITH BIOPSY performed by Elvira Maria MD at [facility]  11/5/15    VOCAL CORD AUGMENTATION W/RADIESSE INJ Bilateral      Social History: Albino Ayers  reports that she quit smoking about 3 years ago. Her smoking use included cigarettes. She started smoking about 29 years ago. She has a 37.50 pack-year smoking history. She has never used smokeless tobacco. She reports current drug use. Frequency: 5.00 times per week. Drug: Marijuana. She reports that she does not drink alcohol.     Family History   Problem Relation Age of Onset    High Blood Pressure Father     COPD Father  Heart Disease Father     Heart Disease Maternal Grandmother     Diabetes Maternal Grandmother     High Blood Pressure Maternal Grandmother     Heart Disease Maternal Grandfather     Heart Disease Sister     Other Brother         epilepsy    Heart Disease Brother      On exam: Blood pressure 123/73, pulse 86, temperature 97.1 °F (36.2 °C), height 5' 2\" (1.575 m), weight 115 lb (52.2 kg), not currently breastfeeding.       NEUROLOGIC EXAMINATION  GENERAL  Appears comfortable and in no distress   HEENT  NC/ AT   NECK  Supple and no bruits heard   MENTAL STATUS:  Alert, oriented, intact memory, no confusion, dysarthric speech (chronic), normal language, no hallucination or delusion; appropriate affect   CRANIAL NERVES: II     -      PERRLA, fundi reveal intact venous pulsations, visual fields intact to confrontation  III,IV,VI -  EOMs full, no afferent defect, no                      HAIDER, no ptosis  V     -     Normal facial sensation  VII    -     Normal facial symmetry  VIII   -     Intact hearing  IX,X -     Symmetrical palate  XI    -     Symmetrical shoulder shrug  XII   -     Midline tongue, no atrophy    MOTOR FUNCTION:  significant for weakness of grade 4+/5 in Rt upper and Rt lower extremities; 5/5 in left upper and left lower extremities with normal bulk, normal tone and no involuntary movements, no tremor   SENSORY FUNCTION:  decreased LT, PP in Rt UE and Rt LE   CEREBELLAR FUNCTION:  signficant for dysmetria on FNF on right side and intact fine motor control over left UE   REFLEX FUNCTION:  Symmetric, no perverted reflex, no Babinski sign   STATION and GAIT  Normal station, hemiplegic gait     CBC:     Lab Results   Component Value Date    WBC 7.8 01/10/2019     01/10/2019      BMP:     Lab Results   Component Value Date     (L) 01/10/2019    K 4.3 01/10/2019    GLUCOSE 89 01/10/2019    CALCIUM 9.1 01/10/2019       Lab Results   Component Value Date    PHENYTOIN 6.3 (L) 06/29/2017 prevent bone loss on long-term AED therapy. Hx of hosp on 9/25/ 2018 for large basilar artery aneurysm; s/p stent assisted coil embolization at [facility]. Rpt hosp on 10/8/2018 for small left thalamic bleed with resultant right sided weakness and gait ataxia; Antiplatelets held for few days and then started back on ASA and Plavix as per endovascular neurologist, dr Villatoro  at [facility].     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 Follow-up in 6 months or sooner for further questions or concerns. Please note that portions of this note were completed with a voice recognition program.  Although every effort was made to ensure the accuracy of this  automated transcription, some errors in transcription may have occurred, occasionally words and mis-transcribed.

## 2020-07-30 NOTE — PROGRESS NOTES
NEUROLOGY FOLLOW-UP  Patient Name:  Meli Jett  :   1960  Clinic Visit Date: 2020        REVIEW OF SYSTEMS  Constitutional Weight changes: absent, Fevers : absent, Fatigue: absent; Any recent hospitalizations:  absent.    HEENT Ears: normal, Eyes: readers, Visual disturbance: absent   Reespiratory Shortness of breath: absent, Cough: absent   Cardivascular Chest pain: absent, Leg swelling :absent   GI Constipation: absent, Diarrhea: absent, Swallowing change: absent    Urinary frequency: absent, Urinary urgency: absent, Urinary incontinence: absent   Musculoskeletal Neck pain: absent, Back pain: absent, Stiffness: absent, Muscle pain: absent, Joint pain: absent   Dermatological Hair loss: absent, Skin changes: absent   Neurological Memory loss: absent, Confusion: absent, Seizures: absent; Trouble walking or imbalance: present, Dizziness: absent, Weakness: present, Numbness present, Tremor: absent, Spasm: absent, Speech difficulty: absent, Headache: absent, Light sensitivity: absent   Psychiatric Anxiety: some, Depression some, Suicidal ideations absent   Hematologic Abnormal bleeding: absent, Anemia: absent, Clotting disorder: absent, Lymph gland changes: absent

## 2020-08-20 ENCOUNTER — TELEPHONE (OUTPATIENT)
Dept: FAMILY MEDICINE CLINIC | Age: 60
End: 2020-08-20

## 2020-08-20 RX ORDER — VENLAFAXINE HYDROCHLORIDE 150 MG/1
150 CAPSULE, EXTENDED RELEASE ORAL DAILY
Qty: 30 CAPSULE | Refills: 0 | Status: SHIPPED | OUTPATIENT
Start: 2020-08-20 | End: 2020-08-27 | Stop reason: SDUPTHER

## 2020-08-20 NOTE — TELEPHONE ENCOUNTER
episode, moderate (HCC)     Ulnar neuropathy of right upper extremity     Carpal tunnel syndrome of right wrist     Cancer of supraglottis (HCC)     Convulsions (HCC)     Generalized convulsive epilepsy without intractable epilepsy ([facility] [address]     Subtherapeutic phenytoin level     Positive FIT (fecal immunochemical test)     Herniated lumbar intervertebral disc     Chronic back pain     Colon polyp     Gait disturbance, post-stroke     Right sided weakness     Aneurysm (HCC)     Cerebral aneurysm, nonruptured     Intracerebral hemorrhage (HCC)     Anemia     History of stroke     Basilar artery aneurysm ([facility] [address]     History of intracranial hemorrhage (left thalamic bleed) Oct 2018

## 2020-08-26 ENCOUNTER — TELEPHONE (OUTPATIENT)
Dept: FAMILY MEDICINE CLINIC | Age: 60
End: 2020-08-26

## 2020-08-27 ENCOUNTER — OFFICE VISIT (OUTPATIENT)
Dept: FAMILY MEDICINE CLINIC | Age: 60
End: 2020-08-27
Payer: MEDICAID

## 2020-08-27 VITALS
SYSTOLIC BLOOD PRESSURE: 132 MMHG | TEMPERATURE: 96.2 F | BODY MASS INDEX: 21.16 KG/M2 | DIASTOLIC BLOOD PRESSURE: 78 MMHG | HEIGHT: 62 IN | HEART RATE: 94 BPM | OXYGEN SATURATION: 94 % | WEIGHT: 115 LBS | RESPIRATION RATE: 16 BRPM

## 2020-08-27 PROBLEM — I10 HYPERTENSION: Status: ACTIVE | Noted: 2018-08-30

## 2020-08-27 PROBLEM — R56.9 SEIZURE (HCC): Status: ACTIVE | Noted: 2020-08-27

## 2020-08-27 PROBLEM — C68.0 TRANSITIONAL CELL CARCINOMA OF URETHRA (HCC): Status: ACTIVE | Noted: 2020-08-27

## 2020-08-27 PROCEDURE — 1036F TOBACCO NON-USER: CPT | Performed by: NURSE PRACTITIONER

## 2020-08-27 PROCEDURE — G8427 DOCREV CUR MEDS BY ELIG CLIN: HCPCS | Performed by: NURSE PRACTITIONER

## 2020-08-27 PROCEDURE — G8420 CALC BMI NORM PARAMETERS: HCPCS | Performed by: NURSE PRACTITIONER

## 2020-08-27 PROCEDURE — 99214 OFFICE O/P EST MOD 30 MIN: CPT | Performed by: NURSE PRACTITIONER

## 2020-08-27 PROCEDURE — 3017F COLORECTAL CA SCREEN DOC REV: CPT | Performed by: NURSE PRACTITIONER

## 2020-08-27 RX ORDER — VENLAFAXINE HYDROCHLORIDE 150 MG/1
150 CAPSULE, EXTENDED RELEASE ORAL DAILY
Qty: 30 CAPSULE | Refills: 0 | Status: SHIPPED | OUTPATIENT
Start: 2020-08-27 | End: 2020-10-14

## 2020-08-27 RX ORDER — TRAZODONE HYDROCHLORIDE 100 MG/1
100 TABLET ORAL NIGHTLY
Qty: 90 TABLET | Refills: 1 | Status: SHIPPED | OUTPATIENT
Start: 2020-08-27 | End: 2021-03-10

## 2020-08-27 RX ORDER — CLOPIDOGREL BISULFATE 75 MG/1
75 TABLET ORAL DAILY
Qty: 90 TABLET | Refills: 1 | Status: SHIPPED | OUTPATIENT
Start: 2020-08-27 | End: 2021-02-25

## 2020-08-27 SDOH — ECONOMIC STABILITY: FOOD INSECURITY: WITHIN THE PAST 12 MONTHS, YOU WORRIED THAT YOUR FOOD WOULD RUN OUT BEFORE YOU GOT MONEY TO BUY MORE.: NEVER TRUE

## 2020-08-27 SDOH — ECONOMIC STABILITY: INCOME INSECURITY: HOW HARD IS IT FOR YOU TO PAY FOR THE VERY BASICS LIKE FOOD, HOUSING, MEDICAL CARE, AND HEATING?: NOT HARD AT ALL

## 2020-08-27 SDOH — ECONOMIC STABILITY: FOOD INSECURITY: WITHIN THE PAST 12 MONTHS, THE FOOD YOU BOUGHT JUST DIDN'T LAST AND YOU DIDN'T HAVE MONEY TO GET MORE.: NEVER TRUE

## 2020-08-27 SDOH — ECONOMIC STABILITY: TRANSPORTATION INSECURITY
IN THE PAST 12 MONTHS, HAS LACK OF TRANSPORTATION KEPT YOU FROM MEETINGS, WORK, OR FROM GETTING THINGS NEEDED FOR DAILY LIVING?: NO

## 2020-08-27 SDOH — ECONOMIC STABILITY: TRANSPORTATION INSECURITY
IN THE PAST 12 MONTHS, HAS THE LACK OF TRANSPORTATION KEPT YOU FROM MEDICAL APPOINTMENTS OR FROM GETTING MEDICATIONS?: NO

## 2020-08-27 ASSESSMENT — PATIENT HEALTH QUESTIONNAIRE - PHQ9
SUM OF ALL RESPONSES TO PHQ QUESTIONS 1-9: 0
SUM OF ALL RESPONSES TO PHQ QUESTIONS 1-9: 0
2. FEELING DOWN, DEPRESSED OR HOPELESS: 0
SUM OF ALL RESPONSES TO PHQ9 QUESTIONS 1 & 2: 0
1. LITTLE INTEREST OR PLEASURE IN DOING THINGS: 0

## 2020-08-27 NOTE — PROGRESS NOTES
Subjective:      Patient ID: Richard Smith is a 61 y.o. female. Visit Information    Have you changed or started any medications since your last visit including any over-the-counter medicines, vitamins, or herbal medicines? no   Are you having any side effects from any of your medications? -  no  Have you stopped taking any of your medications? Is so, why? -  no    Have you seen any other physician or provider since your last visit? Neurologist   Have you had any other diagnostic tests since your last visit? Ct scans   Have you been seen in the emergency room and/or had an admission to a hospital since we last saw you? No  Have you had your routine dental cleaning in the past 6 months? no    Have you activated your [employer] account? If not, what are your barriers?  Yes     Patient Care Team:  ALIRIO Oliveira NP as PCP - General (Nurse Practitioner)  ALIRIO Oliveira NP as PCP - [facility] Provider  Kaitlin Anderson MD as Consulting Physician (Gastroenterology)    Medical History Review  Past Medical, Family, and Social History reviewed and does contribute to the patient presenting condition    Health Maintenance   Topic Date Due    Breast cancer screen  06/01/2010    Low dose CT lung screening  06/01/2015    Colon cancer screen colonoscopy  10/04/2018    Potassium monitoring  01/10/2020    Creatinine monitoring  01/10/2020    DTaP/Tdap/Td vaccine (1 - Tdap) 01/09/2021 (Originally 6/1/1979)    Shingles Vaccine (1 of 2) 01/09/2021 (Originally 6/1/2010)    HIV screen  01/09/2021 (Originally 6/1/1975)    Cervical cancer screen  05/28/2021 (Originally 11/23/2018)    Flu vaccine (1) 09/01/2020    Lipid screen  01/10/2024    Hepatitis C screen  Completed    Hepatitis A vaccine  Aged Out    Hepatitis B vaccine  Aged Out    Hib vaccine  Aged Out    Meningococcal (ACWY) vaccine  Aged Out    Pneumococcal 0-64 years Vaccine  Aged Out     /78 (Site: Left Upper Arm, Position: Sitting, Cuff Size: Medium Adult)   Pulse 94   Temp 96.2 °F (35.7 °C) (Temporal)   Resp 16   Ht 5' 2\" (1.575 m)   Wt 115 lb (52.2 kg)   SpO2 94%   BMI 21.03 kg/m²      PHQ Scores 8/27/2020 1/9/2020 10/3/2018 6/16/2017 7/29/2016 4/12/2016 4/12/2016   PHQ2 Score 0 1 2 0 5 4 2   PHQ9 Score 0 1 2 0 14 12 2     Interpretation of Total Score DepressionSeverity: 1-4 = Minimal depression, 5-9 = Mild depression, 10-14 = Moderate depression, 15-19 = Moderately severe depression, 20-27 = Severe depression    Current Outpatient Medications   Medication Sig Dispense Refill    traZODone (DESYREL) 100 MG tablet Take 1 tablet by mouth nightly 90 tablet 1    clopidogrel (PLAVIX) 75 MG tablet Take 1 tablet by mouth daily 90 tablet 1    venlafaxine (EFFEXOR XR) 150 MG extended release capsule Take 1 capsule by mouth daily Do not crush or break. 30 capsule 0    phenytoin (DILANTIN) 100 MG ER capsule TAKE TWO CAPSULES BY MOUTH EVERY MORNING AND TAKE THREE CAPSULES BY MOUTH EVERY NIGHT 150 capsule 3    losartan (COZAAR) 100 MG tablet Take 1 tablet by mouth daily 90 tablet 1    amLODIPine (NORVASC) 2.5 MG tablet Take 1 tablet by mouth daily 90 tablet 3    aspirin 81 MG chewable tablet Take 81 mg by mouth daily       No current facility-administered medications for this visit. Patient presents to office today with concerns regarding wound on the back of left leg. She reports it is been there for 3 weeks. She thought it was some kind of pimple and or boil. She has tried to pop it. It has not resolved and has grown in size. She reports it is tender in the periwound area. She has been cleaning with peroxide and putting Neosporin on it. Patient has a significant history for stroke, aneurysm, previous trach, previous PEG. She is concerned that this could be something \"weird\" as she reports she seems to develop crazy things. She reports a good appetite. Drinking fluids. Denies fever, nausea, vomiting, diarrhea.   Sleeping okay.  Normal bowel and bladder pattern. Review of Systems   Constitutional: Negative for activity change, appetite change, fatigue and fever. HENT: Negative for congestion, rhinorrhea and sore throat. Eyes: Negative for visual disturbance. Respiratory: Negative for cough and shortness of breath. Cardiovascular: Negative for chest pain and palpitations. Gastrointestinal: Negative for constipation, diarrhea, nausea and vomiting. Genitourinary: Negative for dysuria and urgency. Skin: Positive for wound (back of left leg). Allergic/Immunologic: Negative for immunocompromised state. Neurological: Negative for syncope, light-headedness and headaches. Psychiatric/Behavioral: Negative for decreased concentration, dysphoric mood, sleep disturbance and suicidal ideas. The patient is not nervous/anxious. Objective:   Physical Exam  Vitals signs and nursing note reviewed. Constitutional:       Appearance: Normal appearance. She is well-developed and well-groomed. HENT:      Head: Normocephalic and atraumatic. Right Ear: Hearing and external ear normal.      Left Ear: Hearing and external ear normal.      Nose: Nose normal.      Mouth/Throat:      Lips: Pink. Mouth: Mucous membranes are moist.      Pharynx: Oropharynx is clear. Uvula midline. Eyes:      Conjunctiva/sclera: Conjunctivae normal.      Pupils: Pupils are equal, round, and reactive to light. Neck:      Musculoskeletal: Normal range of motion. Thyroid: No thyroid mass. Trachea: Trachea normal.   Cardiovascular:      Rate and Rhythm: Normal rate and regular rhythm. Pulses: Normal pulses. Radial pulses are 2+ on the right side and 2+ on the left side. Dorsalis pedis pulses are 2+ on the right side and 2+ on the left side. Posterior tibial pulses are 2+ on the right side and 2+ on the left side. Heart sounds: Normal heart sounds, S1 normal and S2 normal. No murmur. Pulmonary:      Effort: Pulmonary effort is normal.      Breath sounds: Normal breath sounds. No decreased breath sounds or wheezing. Abdominal:      General: Bowel sounds are normal.      Palpations: Abdomen is soft. Musculoskeletal:      Right lower leg: No edema. Left lower leg: No edema. Lymphadenopathy:      Cervical: No cervical adenopathy. Skin:     General: Skin is warm and dry. Capillary Refill: Capillary refill takes less than 2 seconds. Coloration: Skin is not pale. Findings: Wound present. Comments: See attached pictures   Neurological:      Mental Status: She is alert and oriented to person, place, and time. Motor: Motor function is intact. Coordination: Coordination is intact. Psychiatric:         Attention and Perception: Attention normal.         Mood and Affect: Mood normal.         Speech: Speech normal.         Behavior: Behavior normal. Behavior is cooperative. Thought Content: Thought content normal.         Cognition and Memory: Cognition normal.         Judgment: Judgment normal.               Assessment / Plan:     1. Pigmented skin lesion suspicious for malignant neoplasm  New-left leg  - Jaspal Mcpherson MD, Dermatology, [place]    2. Essential hypertension  Stable   - Comprehensive Metabolic Panel; Future    3. Major depressive disorder, single episode, moderate (HCC)  Stable   - venlafaxine (EFFEXOR XR) 150 MG extended release capsule; Take 1 capsule by mouth daily Do not crush or break. Dispense: 30 capsule; Refill: 0  - Lipid, Fasting; Future  - CBC; Future    4. Generalized anxiety disorder  Stable   - venlafaxine (EFFEXOR XR) 150 MG extended release capsule; Take 1 capsule by mouth daily Do not crush or break. Dispense: 30 capsule; Refill: 0  - Lipid, Fasting; Future  - CBC; Future    5. Psychophysiological insomnia  Stable   - traZODone (DESYREL) 100 MG tablet; Take 1 tablet by mouth nightly  Dispense: 90 tablet;  Refill:

## 2020-08-30 ASSESSMENT — ENCOUNTER SYMPTOMS
SHORTNESS OF BREATH: 0
SORE THROAT: 0
DIARRHEA: 0
RHINORRHEA: 0
CONSTIPATION: 0
NAUSEA: 0
VOMITING: 0
COUGH: 0

## 2020-09-01 ENCOUNTER — OFFICE VISIT (OUTPATIENT)
Dept: DERMATOLOGY | Age: 60
End: 2020-09-01
Payer: MEDICAID

## 2020-09-01 ENCOUNTER — HOSPITAL ENCOUNTER (OUTPATIENT)
Age: 60
Setting detail: SPECIMEN
Discharge: HOME | End: 2020-09-01
Payer: MEDICAID

## 2020-09-01 VITALS
BODY MASS INDEX: 21.2 KG/M2 | DIASTOLIC BLOOD PRESSURE: 82 MMHG | SYSTOLIC BLOOD PRESSURE: 146 MMHG | HEART RATE: 99 BPM | OXYGEN SATURATION: 95 % | TEMPERATURE: 97.2 F | WEIGHT: 115.2 LBS | HEIGHT: 62 IN

## 2020-09-01 PROCEDURE — 99202 OFFICE O/P NEW SF 15 MIN: CPT | Performed by: DERMATOLOGY

## 2020-09-01 PROCEDURE — G8420 CALC BMI NORM PARAMETERS: HCPCS | Performed by: DERMATOLOGY

## 2020-09-01 PROCEDURE — 3017F COLORECTAL CA SCREEN DOC REV: CPT | Performed by: DERMATOLOGY

## 2020-09-01 PROCEDURE — G8427 DOCREV CUR MEDS BY ELIG CLIN: HCPCS | Performed by: DERMATOLOGY

## 2020-09-01 PROCEDURE — 1036F TOBACCO NON-USER: CPT | Performed by: DERMATOLOGY

## 2020-09-01 PROCEDURE — 11102 TANGNTL BX SKIN SINGLE LES: CPT | Performed by: DERMATOLOGY

## 2020-09-01 RX ORDER — LIDOCAINE HYDROCHLORIDE AND EPINEPHRINE 10; 10 MG/ML; UG/ML
0.5 INJECTION, SOLUTION INFILTRATION; PERINEURAL ONCE
Status: COMPLETED | OUTPATIENT
Start: 2020-09-01 | End: 2020-09-01

## 2020-09-01 RX ADMIN — LIDOCAINE HYDROCHLORIDE AND EPINEPHRINE 0.5 ML: 10; 10 INJECTION, SOLUTION INFILTRATION; PERINEURAL at 14:58

## 2020-09-01 NOTE — PROGRESS NOTES
Dermatology Patient Note  [facility] [address]  [address]  Dept: [phone]  Dept Fax: [phone]      VISITDATE: 9/1/2020   REFERRING PROVIDER: ALIRIO Ugalde -*      Luis Felipe Juan is a 61 y.o. female  who presents today in the office for:    New Patient (Pt has a lesion for 5-6 weeks. she states that she thought it was a spider bite. It was hard and she could not pop it. Her PCP does not think it is a bite. )      HISTORY OF PRESENT ILLNESS:  61 y.o. female presenting for lesion  Location: left posterior ankle  Duration: 5-6 weeks  Symptoms: none  Course: persistent  Exacerbating factors: none  Prior treatments: none    Also complains of wrinkles and blackheads      CURRENT MEDICATIONS:   Current Outpatient Medications   Medication Sig Dispense Refill    tretinoin (RETIN-A) 0.025 % cream Apply pea sized amount to face nightly 45 g 3    traZODone (DESYREL) 100 MG tablet Take 1 tablet by mouth nightly 90 tablet 1    clopidogrel (PLAVIX) 75 MG tablet Take 1 tablet by mouth daily 90 tablet 1    venlafaxine (EFFEXOR XR) 150 MG extended release capsule Take 1 capsule by mouth daily Do not crush or break. 30 capsule 0    phenytoin (DILANTIN) 100 MG ER capsule TAKE TWO CAPSULES BY MOUTH EVERY MORNING AND TAKE THREE CAPSULES BY MOUTH EVERY NIGHT 150 capsule 3    losartan (COZAAR) 100 MG tablet Take 1 tablet by mouth daily 90 tablet 1    amLODIPine (NORVASC) 2.5 MG tablet Take 1 tablet by mouth daily 90 tablet 3    aspirin 81 MG chewable tablet Take 81 mg by mouth daily       No current facility-administered medications for this visit.         ALLERGIES:   Allergies   Allergen Reactions    Lisinopril Swelling       SOCIAL HISTORY:  Social History     Tobacco Use    Smoking status: Former Smoker     Packs/day: 1.50     Years: 25.00     Pack years: 37.50     Types: Cigarettes     Start date: 1/1/1991     Last attempt to quit: 9/29/2016 note was created with the assistance of a speech-recognition program.  Although the intention is to generate a document that actually reflects the content of the visit, no guarantees can be provided that every mistake has been identified and corrected byediting.     Electronically signed by Kareem Mahmood MD on 9/1/20 at 2:38 PM EDT

## 2020-09-01 NOTE — PATIENT INSTRUCTIONS
BIOPSY WOUND CARE    A biopsy is where a small piece of skin tissue is removed and examined by a pathologist.  When a biopsy is done, there is a small wound site that requires proper care to prevent infection and scarring. Some biopsies require sutures and their removal.    How to Care for Biopsy Wound    A.  Leave band-aid or dressing on for 24 hours. B. Wash two times a day with soap and water. C.  Let the wound air dry, then apply Vaseline ointment and cover with a Band-Aid       unless otherwise instructed by your provider. D. If there is slight discomfort, you may give acetaminophen or ibuprofen. When To Call the Doctor    Call the Dermatology Clinic or your doctor if any of the following occur:    A. Redness and swelling  B. Tenderness and warm to touch  C.  Drainage from wound  D. Fever    Biopsy Results    Biopsy results are usually available in 1-2 weeks. We provide biopsy results in letters for begin results or we will call for any concerning results. If you have not heard from our staff please call the office within 2 weeks. Please call our office with any concerns at [phone]. Apply a pea-sized amount of Tretinoin 0.025% cream/onto your finger. Dab the medicine onto your forehead, nose, chin, and each cheek. Then gently spread a thin layer across the entire face. Do not wash off this medicine. These medications can also be used on your chest, back and shoulder areas.

## 2020-09-03 LAB — DERMATOLOGY PATHOLOGY REPORT: NORMAL

## 2020-09-03 RX ORDER — VENLAFAXINE HYDROCHLORIDE 150 MG/1
150 CAPSULE, EXTENDED RELEASE ORAL DAILY
Qty: 30 CAPSULE | Refills: 0 | OUTPATIENT
Start: 2020-09-03 | End: 2020-10-03

## 2020-09-03 NOTE — TELEPHONE ENCOUNTER
Last OARRS Review-0  Last Office Visit-08/27/2020  Return To Office-s/s worsen   Next Appointment Date-0  Last Refill Date-08/27/2020   Number of Refills Given- 0    Health Maintenance   Topic Date Due    Breast cancer screen  06/01/2010    Low dose CT lung screening  06/01/2015    Colon cancer screen colonoscopy  10/04/2018    Potassium monitoring  01/10/2020    Creatinine monitoring  01/10/2020    Flu vaccine (1) 09/01/2020    DTaP/Tdap/Td vaccine (1 - Tdap) 01/09/2021 (Originally 6/1/1979)    Shingles Vaccine (1 of 2) 01/09/2021 (Originally 6/1/2010)    HIV screen  01/09/2021 (Originally 6/1/1975)    Cervical cancer screen  05/28/2021 (Originally 11/23/2018)    Lipid screen  01/10/2024    Hepatitis C screen  Completed    Hepatitis A vaccine  Aged Out    Hepatitis B vaccine  Aged Out    Hib vaccine  Aged Out    Meningococcal (ACWY) vaccine  Aged Out    Pneumococcal 0-64 years Vaccine  Aged Out             (applicable per patient's age: Cancer Screenings, Depression Screening, Fall Risk Screening, Immunizations)    LDL Cholesterol (mg/dL)   Date Value   01/10/2019 149 (H)     AST (U/L)   Date Value   01/10/2019 12     ALT (U/L)   Date Value   01/10/2019 7     BUN (mg/dL)   Date Value   01/10/2019 11      (goal A1C is < 7)   (goal LDL is <100) need 30-50% reduction from baseline     BP Readings from Last 3 Encounters:   09/01/20 (!) 146/82   08/27/20 132/78   07/30/20 123/73    (goal /80)      All Future Testing planned in [facility]:  Lab Frequency Next Occurrence   CT Lung Screen (Annual) Once 01/09/2020   TSH with Reflex Once 01/09/2020   DARY DIGITAL SCREEN W CAD BILATERAL Once 02/08/2020   Phenytoin Level, Total and Free Once 09/10/2020   AST Once 09/10/2020   ALT Once 09/10/2020   Comprehensive Metabolic Panel Once 07/37/4688   Lipid, Fasting Once 08/27/2020   CBC Once 08/27/2020   Surgical Pathology Once 09/01/2020       Next Visit Date:  Future Appointments   Date Time Provider Department Center   2/1/2021 10:20 AM Cindy Thomas MD Neuro Spec [facility]            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)     Seizures (HCC)     Anxiety     Depression     Hypoglycemia     Major depressive disorder, single episode, moderate (HCC)     Ulnar neuropathy of right upper extremity     Carpal tunnel syndrome of right wrist     Cancer of supraglottis (HCC)     Convulsions (HCC)     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

## 2020-09-04 ENCOUNTER — TELEPHONE (OUTPATIENT)
Dept: DERMATOLOGY | Age: 60
End: 2020-09-04

## 2020-09-04 NOTE — TELEPHONE ENCOUNTER
Please inform patient that the lesion we biopsied is a common form of skin cancer called a squamous cell carcinoma. Due to location, it should be removed with mohs surgery, which gets the highest cure rate (>99%) while removing the least amount of skin. Referral placed to Dr. Nicole Momin. She should be seen for follow-up with me in 6 months if not already scheduled.

## 2020-09-17 ENCOUNTER — TELEPHONE (OUTPATIENT)
Dept: DERMATOLOGY | Age: 60
End: 2020-09-17

## 2020-09-17 NOTE — TELEPHONE ENCOUNTER
Dr Carisa Jacobson office called and stated that our office had to PA the [facility] procedure  I called [facility] @ [phone].    *asked for 2 visits as requested from their office  *provider Is out of network  *they submitted an auth request  *I gave them the code of 46785 & [phone]  *case is pending authorization, the auth # [id number]  *Valid from 9/17/2020--12/16/2020   *Nurses may call back asking for clinical documentations

## 2020-09-29 NOTE — TELEPHONE ENCOUNTER
Will Call In The Morning for 2nd Code. Dr Haylee Valdes will do office visit, I will call for the 2nd code and they will have me call again give me the remainder of codes.

## 2020-09-30 NOTE — TELEPHONE ENCOUNTER
-Called Dr Gregorio Walters office and she states that they are not in network and I will have to call to get the code approved  -[address] spoke w/ Enzo Bence  -We will start the [facility] for [phone]  -Pending Ref#  [id number]  -We should receive a fax from their intake department as to if the PA is approved/denied.

## 2020-09-30 NOTE — TELEPHONE ENCOUNTER
*Called [phone]  Leonor Mclean states that [id number]  Code does not require authorization. Its a covered benefit and Dr Lora Given in network. [facility] provider. com and check codes to see if they are approved.    *Left VM for Dr Lora Given Mgr that takes care of the Prior Auth

## 2020-10-06 NOTE — TELEPHONE ENCOUNTER
Approved  -Dr Nichelle Rodríguez wants us to call again to get the codes approved for [facility] procedure.

## 2020-10-08 ENCOUNTER — TELEPHONE (OUTPATIENT)
Dept: DERMATOLOGY | Age: 60
End: 2020-10-08

## 2020-10-08 NOTE — TELEPHONE ENCOUNTER
Vadim Chen called with codes for Mohs procedure [phone], [phone]. To do a second prior [facility].   The first time there was a problem because they had the tax ID and NPI numbers for Dr Piedad Sanderson NPI [id number]  Tax ID [id number]

## 2020-10-12 NOTE — TELEPHONE ENCOUNTER
Dr Alex Reza office called with the codes:  Codes: 35634, [address] [facility], [facility] [address] [phone]

## 2020-10-14 RX ORDER — VENLAFAXINE HYDROCHLORIDE 150 MG/1
CAPSULE, EXTENDED RELEASE ORAL
Qty: 30 CAPSULE | Refills: 2 | Status: SHIPPED | OUTPATIENT
Start: 2020-10-14 | End: 2021-01-20

## 2020-10-14 NOTE — TELEPHONE ENCOUNTER
* Spoke w/ Katherin Quinones at [facility] NPI [id number]  *pending Auth # [id number]  *In clinical review currenty

## 2020-10-14 NOTE — TELEPHONE ENCOUNTER
Last visit: 08/27/20  Last Med refill: 8/27/20      Next Visit Date:  Future Appointments   Date Time Provider Lillie Moncada   2/1/2021 10:20 AM Miah Ortiz MD Neuro Spec [facility]   3/8/2021 10:00 AM Josseline Melton MD  derm [address] Maintenance   Topic Date Due    Breast cancer screen  06/01/2010    Low dose CT lung screening  06/01/2015    Colon cancer screen colonoscopy  10/04/2018    Potassium monitoring  01/10/2020    Creatinine monitoring  01/10/2020    Flu vaccine (1) 09/01/2020    DTaP/Tdap/Td vaccine (1 - Tdap) 01/09/2021 (Originally 6/1/1979)    Shingles Vaccine (1 of 2) 01/09/2021 (Originally 6/1/2010)    HIV screen  01/09/2021 (Originally [birth year]/1/1975)    Cervical cancer screen  05/28/2021 (Originally 11/23/2018)    Lipid screen  01/10/2024    Hepatitis C screen  Completed    Hepatitis A vaccine  Aged Out    Hepatitis B vaccine  Aged Out    Hib vaccine  Aged Out    Meningococcal (ACWY) vaccine  Aged Out    Pneumococcal 0-64 years Vaccine  Aged Out       No results found for: LABA1C          ( goal A1C is < 7)   No results found for: LABMICR  LDL Cholesterol (mg/dL)   Date Value   01/10/2019 149 (H)   04/22/2016 94       (goal LDL is <100)   AST (U/L)   Date Value   01/10/2019 12     ALT (U/L)   Date Value   01/10/2019 7     BUN (mg/dL)   Date Value   01/10/2019 11     BP Readings from Last 3 Encounters:   09/01/20 (!) 146/82   08/27/20 132/78   07/30/20 123/73          (goal 120/80)    All Future Testing planned in [facility]  Lab Frequency Next Occurrence   CT Lung Screen (Annual) Once 01/09/2020   TSH with Reflex Once 01/09/2020   DARY DIGITAL SCREEN W CAD BILATERAL Once 02/08/2020   Phenytoin Level, Total and Free Once 12/18/2020   AST Once 12/18/2020   ALT Once 12/18/2020   Comprehensive Metabolic Panel Once 04/09/4029   Lipid, Fasting Once 08/27/2020   CBC Once 08/27/2020   Surgical Pathology Once 09/01/2020               Patient Active Problem List:

## 2020-12-15 RX ORDER — LOSARTAN POTASSIUM 100 MG/1
TABLET ORAL
Qty: 90 TABLET | Refills: 0 | Status: SHIPPED | OUTPATIENT
Start: 2020-12-15 | End: 2021-03-10

## 2020-12-15 NOTE — TELEPHONE ENCOUNTER
Pharmacy requesting refill of Dilantin.       Medication active on med list yes      Date of last fill: 7/30/20  verified on 12/15/2020   verified by [facility] LPN      Date of last appointment 7/30/20    Next Visit Date:  2/1/2021

## 2020-12-16 RX ORDER — AMLODIPINE BESYLATE 2.5 MG/1
2.5 TABLET ORAL DAILY
Qty: 90 TABLET | Refills: 3 | Status: SHIPPED | OUTPATIENT
Start: 2020-12-16 | End: 2021-12-16

## 2020-12-16 NOTE — TELEPHONE ENCOUNTER
Pt is out of this medication as of today. The pharmacy was supposed to submit but did not. She had brain surgery and this is a medication that she said she must have daily. She is very concerned that she will have adverse side effects if she does not take daily and needs filled today.     Last OV 8/27/2020  Next OV Not scheduled    Humble Parry
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

## 2020-12-17 RX ORDER — PHENYTOIN SODIUM 100 MG/1
CAPSULE, EXTENDED RELEASE ORAL
Qty: 150 CAPSULE | Refills: 1 | Status: SHIPPED | OUTPATIENT
Start: 2020-12-17 | End: 2021-02-01 | Stop reason: SDUPTHER

## 2021-01-20 DIAGNOSIS — F32.1 MAJOR DEPRESSIVE DISORDER, SINGLE EPISODE, MODERATE (HCC): Chronic | ICD-10-CM

## 2021-01-20 DIAGNOSIS — F41.1 GENERALIZED ANXIETY DISORDER: ICD-10-CM

## 2021-01-20 RX ORDER — VENLAFAXINE HYDROCHLORIDE 150 MG/1
150 CAPSULE, EXTENDED RELEASE ORAL DAILY
Qty: 90 CAPSULE | Refills: 1 | Status: SHIPPED | OUTPATIENT
Start: 2021-01-20 | End: 2022-01-20

## 2021-01-28 ENCOUNTER — TELEPHONE (OUTPATIENT)
Dept: GASTROENTEROLOGY | Age: 61
End: 2021-01-28

## 2021-02-01 ENCOUNTER — VIRTUAL VISIT (OUTPATIENT)
Dept: NEUROLOGY | Age: 61
End: 2021-02-01
Payer: MEDICAID

## 2021-02-01 DIAGNOSIS — I61.0 THALAMIC HEMORRHAGE (HCC): ICD-10-CM

## 2021-02-01 DIAGNOSIS — Z86.79 HISTORY OF INTRACRANIAL HEMORRHAGE: ICD-10-CM

## 2021-02-01 DIAGNOSIS — Z82.0 FAMILY HISTORY OF SEIZURE DISORDER: ICD-10-CM

## 2021-02-01 DIAGNOSIS — G40.309 GENERALIZED CONVULSIVE EPILEPSY WITHOUT INTRACTABLE EPILEPSY (HCC): Primary | ICD-10-CM

## 2021-02-01 DIAGNOSIS — Z51.81 MEDICATION MONITORING ENCOUNTER: ICD-10-CM

## 2021-02-01 PROCEDURE — 99214 OFFICE O/P EST MOD 30 MIN: CPT | Performed by: PSYCHIATRY & NEUROLOGY

## 2021-02-01 PROCEDURE — 3017F COLORECTAL CA SCREEN DOC REV: CPT | Performed by: PSYCHIATRY & NEUROLOGY

## 2021-02-01 PROCEDURE — G8428 CUR MEDS NOT DOCUMENT: HCPCS | Performed by: PSYCHIATRY & NEUROLOGY

## 2021-02-01 RX ORDER — PHENYTOIN SODIUM 100 MG/1
CAPSULE, EXTENDED RELEASE ORAL
Qty: 150 CAPSULE | Refills: 3 | Status: SHIPPED | OUTPATIENT
Start: 2021-02-01 | End: 2021-07-15

## 2021-02-01 NOTE — PROGRESS NOTES
absent. HEENT Ears: normal, Eyes: glasses, Visual disturbance: absent   Reespiratory Shortness of breath: absent, Cough: absent   Cardivascular Chest pain: absent, Leg swelling :absent   GI Constipation: absent, Diarrhea: absent, Swallowing change: absent    Urinary frequency: absent, Urinary urgency: absent, Urinary incontinence: absent   Musculoskeletal Neck pain: absent, Back pain: absent, Stiffness: absent, Muscle pain: absent, Joint pain: absent   Dermatological Hair loss: absent, Skin changes: absent   Neurological Memory loss: absent, Confusion: absent, Seizures: absent; Trouble walking or imbalance: absent, Dizziness: absent, Weakness: absent, Numbness present, Tremor: absent, Spasm: absent, Speech difficulty: absent, Headache: absent, Light sensitivity: absent   Psychiatric Anxiety: absent, Depression  absent, Suicidal ideations absent   Hematologic Abnormal bleeding: absent, Anemia: absent, Clotting disorder: absent, Lymph gland changes: absent       Current Outpatient Medications on File Prior to Visit   Medication Sig Dispense Refill    venlafaxine (EFFEXOR XR) 150 MG extended release capsule Take 1 capsule by mouth daily 90 capsule 1    phenytoin (DILANTIN) 100 MG ER capsule TAKE TWO CAPSULES BY MOUTH EVERY MORNING, THEN TAKE THREE CAPSULES BY MOUTH ONCE NIGHTLY 150 capsule 1    amLODIPine (NORVASC) 2.5 MG tablet Take 1 tablet by mouth daily 90 tablet 3    losartan (COZAAR) 100 MG tablet TAKE ONE TABLET BY MOUTH DAILY 90 tablet 0    tretinoin (RETIN-A) 0.025 % cream Apply pea sized amount to face nightly 45 g 3    traZODone (DESYREL) 100 MG tablet Take 1 tablet by mouth nightly 90 tablet 1    clopidogrel (PLAVIX) 75 MG tablet Take 1 tablet by mouth daily 90 tablet 1    aspirin 81 MG chewable tablet Take 81 mg by mouth daily       No current facility-administered medications on file prior to visit. Allergies: Julio Barker is allergic to lisinopril.     Past Medical History: Diagnosis Date    Anemia     Aneurysm ([facility] 09/2018    Anxiety     Arthritis     Cerebral artery occlusion with cerebral infarction ([facility]     oct 8, 2019    Colon polyp 10/04/2017    multi adenomas    COPD (chronic obstructive pulmonary disease) ([facility]     G tube feedings ([facility] 2/2016    every 3-4 hours    History of blood transfusion 1990    x 6 during child birth    Hypoglycemia     Insomnia     Seizures ([facility]     Squamous cell carcinoma of vocal cord ([facility] 11/17/2015    with history of radiation treatments       Past Surgical History:   Procedure Laterality Date    BRAIN SURGERY  sept. 25, 2019    BRONCHOSCOPY  11/5/15    COLONOSCOPY  10/04/2017    multi polyps; poor prep    DENTAL SURGERY Bilateral     LARYNGOSCOPY Right 11/5/15    LUMBAR FUSION  02/07/2018    L3-S1    NERVE BLOCK  11/03/2017    caudal #1, decadron 10 mg    NERVE BLOCK  11/10/2017    caudal #2 decadron 10mg    NERVE BLOCK Left 12/22/2017    left transforaminal #1 decadron 10mg    AR COLONOSCOPY W/BIOPSY SINGLE/MULTIPLE  10/4/2017    COLONOSCOPY WITH BIOPSY performed by Maddie Padron MD at [place] [address]  11/5/15    VOCAL CORD AUGMENTATION W/RADIESSE INJ Bilateral      Social History: Juan Jose  reports that she quit smoking about 4 years ago. Her smoking use included cigarettes. She started smoking about 30 years ago. She has a 37.50 pack-year smoking history. She has never used smokeless tobacco. She reports current drug use. Frequency: 5.00 times per week. Drug: Marijuana. She reports that she does not drink alcohol.     Family History   Problem Relation Age of Onset    High Blood Pressure Father     COPD Father     Heart Disease Father     Heart Disease Maternal Grandmother     Diabetes Maternal Grandmother     High Blood Pressure Maternal Grandmother     Heart Disease Maternal Grandfather     Heart Disease Sister     Other Brother         epilepsy    Heart Disease Brother        NEUROLOGIC EXAMINATION  GENERAL  Appears comfortable and in no distress   HEENT  NC/ AT   NECK  Supple    MENTAL STATUS:  Alert, oriented, intact memory, no confusion, normal speech, normal language, no hallucination or delusion   CRANIAL NERVES: II     -      PERRLA, Visual fields grossly full   III,IV,VI -  EOMs full, no HAIDER, no ptosis  V     -     Unable to perform  VII    -     Normal facial symmetry  VIII   -     Intact hearing  IX,X -     unable to perform  XI    -     Symmetrical shoulder shrug  XII   -     Midline tongue, no atrophy    MOTOR FUNCTION:  significant for visible weakness in right upper and right lower extremities    SENSORY FUNCTION:  Unable to perform   CEREBELLAR FUNCTION:  Intact fine motor control over upper limbs   REFLEX FUNCTION:  Unable to perform   STATION and GAIT  Normal station, normal gait     CBC:     Lab Results   Component Value Date    WBC 7.8 01/10/2019     01/10/2019      BMP:     Lab Results   Component Value Date     (L) 01/10/2019    K 4.3 01/10/2019    GLUCOSE 89 01/10/2019    CALCIUM 9.1 01/10/2019       Lab Results   Component Value Date    PHENYTOIN 6.3 (L) 06/29/2017       Lab Results   Component Value Date    CHOL 270 (H) 01/10/2019    LDLCHOLESTEROL 149 (H) 01/10/2019    HDL 68 01/10/2019    TRIG 266 (H) 01/10/2019    ALT 7 01/10/2019    AST 12 01/10/2019    TSH 0.62 04/22/2016    WDNDECAE64 1222 (H) 04/22/2016       Dilantin Free level (10/12/2018): <0.5  Dilantin Total level (10/12/2018): 8.4        Diagnostic data reviewed with the patient:   NCS/ EMG of Rt UE (7/8/16): Demonstrated electrophysiologic evidence of ulnar neuropathy in right upper extremity localizable to the right forearm proximal to the right wrist.  Also demonstrated right median neuropathy at or distal to the right wrist suggestive of right carpal tunnel syndrome of mild severity. EEG (8/9/16): unremarkable.      CTA head and Neck (4/3/19) at [address] [place] [address]: Postsurgical change in basilar tip region obscuring anatomic detail. No visualized aneurysms. EMG UPPER EXTREMITIES: 9/9/2019   There is electrophysiologic evidence of moderately severe left median sensory neuropathy at or distal to the left wrist compatible with left carpal tunnel syndrome of moderately severe intensity. This study also demonstrated mild left ulnar neuropathy of non-localizable nature. The study did not demonstrate any electrodiagnostic evidence of cervical radiculopathy or brachial plexopathy in the nerves and muscles tested. Impression and Plan: Ms. Julio Barker is a 61 y.o. female with   Generalized tonic-clonic epilepsy: last breakthrough seizure with LOC on 8/13/16; presently on Dilantin 200 in a.m. and 300 in the evening; will check free and total levels today. Discussion done about trial of Dilantin downward taper; patient is agreeable. Will get EEG to evaluate risk. At her request, will continue Dilantin 500 mg/day. Also discussed about daily multivitamin with calcium and vitamin D supplements to prevent bone loss on long-term AED therapy. Left CTS: not using left wrist splint any more as her symptoms are much improved. Rt sided numbness sec to prior left thalamic stroke    Hx of hosp on 9/25/ 2018 for large basilar artery aneurysm; s/p stent assisted coil embolization at [facility]. Rpt hosp on 10/8/2018 for small left thalamic bleed with resultant right sided weakness and gait ataxia;  Still has residual rt sided weakness and using the cane prn. She has been on dual antiplatelets with ASA and Plavix as per endovascular neurologist, dr Brian Joaquin at [facility].     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 Follow-up in 3 months.                      This is a telehealth visit that was performed with the originating site at Patient Location: Patient Home and Provider Location of [place], [place]. Patient ID verified by me prior to start of this visit. Verbal consent to participate in video visit was obtained. Pursuant to the emergency declaration under the [address] [place] [address], [facility] waiver authority and the [employer] Act, this Virtual Visit was conducted, with patient's consent, to reduce the patient's risk of exposure to COVID-19 and provide continuity of care for an established/new patient. Complete and detailed physical examination is not feasible during this virtual video visit and patient is agreeable and understood. Services were provided through a video synchronous discussion virtually to substitute for in-person clinic visit. I discussed with the patient the nature of our telehealth visits via interactive/real-time audio/video that:  - I would evaluate the patient and recommend diagnostics and treatments based on my assessment  - Our sessions are not being recorded and that personal health information is protected  - Our team would provide follow up care in person if/when the patient needs it.

## 2021-02-25 DIAGNOSIS — Z98.890 S/P COIL EMBOLIZATION OF CEREBRAL ANEURYSM: ICD-10-CM

## 2021-02-25 RX ORDER — CLOPIDOGREL BISULFATE 75 MG/1
75 TABLET ORAL DAILY
Qty: 90 TABLET | Refills: 1 | Status: SHIPPED | OUTPATIENT
Start: 2021-02-25 | End: 2022-01-13

## 2021-02-25 NOTE — TELEPHONE ENCOUNTER
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

## 2021-03-10 DIAGNOSIS — I10 ESSENTIAL HYPERTENSION: ICD-10-CM

## 2021-03-10 DIAGNOSIS — F51.04 PSYCHOPHYSIOLOGICAL INSOMNIA: ICD-10-CM

## 2021-03-10 RX ORDER — LOSARTAN POTASSIUM 100 MG/1
100 TABLET ORAL DAILY
Qty: 90 TABLET | Refills: 1 | Status: SHIPPED | OUTPATIENT
Start: 2021-03-10 | End: 2021-08-25

## 2021-03-10 RX ORDER — TRAZODONE HYDROCHLORIDE 100 MG/1
100 TABLET ORAL NIGHTLY
Qty: 90 TABLET | Refills: 1 | Status: SHIPPED | OUTPATIENT
Start: 2021-03-10 | End: 2021-08-25

## 2021-06-17 ENCOUNTER — TELEPHONE (OUTPATIENT)
Dept: FAMILY MEDICINE CLINIC | Age: 61
End: 2021-06-17

## 2021-06-18 NOTE — TELEPHONE ENCOUNTER
Patients in rehab. Wont be home for about 3 weeks. Her S/O confirmed he will have her call back when she gets home to schedule a f/u appt. Patient fell and broke her hip.

## 2021-06-23 NOTE — TELEPHONE ENCOUNTER
Called pt regarding referral. Pt states she does not want to schedule \"unitl covid has calmed down\". Pts last colon 10/4/17 with Dr Maira Jerez; 1-2 year recall due to poor prep.
Colon screen referral letter mailed to pt's home address.
Writer attempted to contact pt by phone, but there was no answer and we are unable to leave msg
Breath sounds clear and equal bilaterally.

## 2021-07-15 DIAGNOSIS — G40.309 GENERALIZED CONVULSIVE EPILEPSY WITHOUT INTRACTABLE EPILEPSY (HCC): ICD-10-CM

## 2021-07-15 RX ORDER — PHENYTOIN SODIUM 100 MG/1
CAPSULE, EXTENDED RELEASE ORAL
Qty: 150 CAPSULE | Refills: 0 | Status: SHIPPED | OUTPATIENT
Start: 2021-07-15 | End: 2021-08-25

## 2021-07-15 NOTE — TELEPHONE ENCOUNTER
Xuan Calle this is a patient of Dr. Haroon Mcpherson. Pharmacy sent a request for her Dilantin. Patient's home health nurse Han Ortiz (RN with [facility]) also called the office. She stated that the patient fell and broke her hip and was just released from [facility]. Patient was given only one day of all her medications. I confirmed her dosage. Could you please approve a month supply for the patient. She will call to reschedule her follow up.          Medication active on med list: yes      Date of last fill: 2/1/21 for #150 and 3 refills  verified on 7/15/2021    verified by Ángela Infante LPN      Date of last appointment: 2/1/2021    Next Visit Date:  Visit date not found

## 2021-07-26 ENCOUNTER — TELEPHONE (OUTPATIENT)
Dept: FAMILY MEDICINE CLINIC | Age: 61
End: 2021-07-26

## 2021-07-26 NOTE — TELEPHONE ENCOUNTER
Yohannes Leonard Nurse with [facility] calling to inform that patient had a fall this AM  no injury Vital signs WNL. No other concerns noted.

## 2021-08-03 ENCOUNTER — TELEPHONE (OUTPATIENT)
Dept: FAMILY MEDICINE CLINIC | Age: 61
End: 2021-08-03

## 2021-08-06 ENCOUNTER — TELEPHONE (OUTPATIENT)
Dept: FAMILY MEDICINE CLINIC | Age: 61
End: 2021-08-06

## 2021-08-06 NOTE — TELEPHONE ENCOUNTER
Malick Gibbons from [facility] PT called and stated that patient reported falling down approx 7 stairs this morning. She was complaining of 10 out of 10 pain in her hip. Malick Gibbons recommended the patient go to the ER and she said that she would once her significant other got home.

## 2021-08-20 ENCOUNTER — TELEPHONE (OUTPATIENT)
Dept: FAMILY MEDICINE CLINIC | Age: 61
End: 2021-08-20

## 2021-08-24 DIAGNOSIS — G40.309 GENERALIZED CONVULSIVE EPILEPSY WITHOUT INTRACTABLE EPILEPSY (HCC): ICD-10-CM

## 2021-08-25 DIAGNOSIS — F51.04 PSYCHOPHYSIOLOGICAL INSOMNIA: ICD-10-CM

## 2021-08-25 DIAGNOSIS — I10 ESSENTIAL HYPERTENSION: ICD-10-CM

## 2021-08-25 RX ORDER — PHENYTOIN SODIUM 100 MG/1
CAPSULE, EXTENDED RELEASE ORAL
Qty: 150 CAPSULE | Refills: 1 | Status: SHIPPED | OUTPATIENT
Start: 2021-08-25 | End: 2021-10-19 | Stop reason: SDUPTHER

## 2021-08-25 NOTE — TELEPHONE ENCOUNTER
Pharmacy requesting a  refill of: Dilantin 100mg ER        Medication active on med list yes        Date of last prescription: 07/15/2021  with 0 refills verified on 08/25/2021    verified by BELA BROWN        Date of last appointment 02/21/2021     Next Visit Date: None, message left to schedule follow up appointment.

## 2021-08-25 NOTE — TELEPHONE ENCOUNTER
Last visit: 8/27/2020  Last Med refill:3/10/2021  Does patient have enough medication for 72 hours: No:     Next Visit Date:  No future appointments.     Health Maintenance   Topic Date Due    COVID-19 Vaccine (1) Never done    HIV screen  Never done    DTaP/Tdap/Td vaccine (1 - Tdap) Never done    Breast cancer screen  Never done    Shingles Vaccine (1 of 2) Never done    Low dose CT lung screening  Never done    Colon cancer screen colonoscopy  10/04/2018    Cervical cancer screen  11/23/2018    Potassium monitoring  01/10/2020    Creatinine monitoring  01/10/2020    Flu vaccine (1) 09/01/2021    Lipid screen  01/10/2024    Hepatitis C screen  Completed    Hepatitis A vaccine  Aged Out    Hepatitis B vaccine  Aged Out    Hib vaccine  Aged Out    Meningococcal (ACWY) vaccine  Aged Out    Pneumococcal 0-64 years Vaccine  Aged Out       No results found for: LABA1C          ( goal A1C is < 7)   No results found for: LABMICR  LDL Cholesterol (mg/dL)   Date Value   01/10/2019 149 (H)   04/22/2016 94       (goal LDL is <100)   AST (U/L)   Date Value   01/10/2019 12     ALT (U/L)   Date Value   01/10/2019 7     BUN (mg/dL)   Date Value   01/10/2019 11     BP Readings from Last 3 Encounters:   09/01/20 (!) 146/82   08/27/20 132/78   07/30/20 123/73          (goal 120/80)    All Future Testing planned in [facility]  Lab Frequency Next Occurrence   Comprehensive Metabolic Panel Once 77/71/3667   Lipid, Fasting Once 08/27/2020   CBC Once 08/27/2020   Phenytoin level, free Once 09/19/2021   Phenytoin Level, Total Once 09/19/2021   EEG Once 09/19/2021               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)     Seizures ([address]     Anxiety     Depression     Hypoglycemia     Major depressive disorder, single episode, moderate (HCC)     Ulnar neuropathy of right upper extremity     Carpal tunnel syndrome of right wrist     Cancer of supraglottis ([address]     Convulsions ([address] 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)     History of intracranial hemorrhage (left thalamic bleed) Oct 2018     Transitional cell carcinoma of urethra ([facility]     Seizure ([facility]

## 2021-08-26 RX ORDER — TRAZODONE HYDROCHLORIDE 100 MG/1
100 TABLET ORAL NIGHTLY
Qty: 90 TABLET | Refills: 1 | Status: SHIPPED | OUTPATIENT
Start: 2021-08-26 | End: 2022-08-26

## 2021-08-26 RX ORDER — LOSARTAN POTASSIUM 100 MG/1
100 TABLET ORAL DAILY
Qty: 90 TABLET | Refills: 1 | Status: SHIPPED | OUTPATIENT
Start: 2021-08-26 | End: 2022-08-26

## 2021-10-19 DIAGNOSIS — G40.309 GENERALIZED CONVULSIVE EPILEPSY WITHOUT INTRACTABLE EPILEPSY (HCC): ICD-10-CM

## 2021-10-19 RX ORDER — PHENYTOIN SODIUM 100 MG/1
CAPSULE, EXTENDED RELEASE ORAL
Qty: 150 CAPSULE | Refills: 3 | Status: SHIPPED | OUTPATIENT
Start: 2021-10-19

## 2021-10-19 NOTE — TELEPHONE ENCOUNTER
Pharmacy requesting a  refill of: Dilantin 100mg ER     Medication active on med list yes     Date of last prescription: 08/25/2021  with 1  refills verified on 10/19/2021  verified by BELA BROWN     Date of last appointment: 02/01/2021     Next Visit Date: None, message left to schedule follow up appointment.

## 2022-01-11 DIAGNOSIS — Z98.890 S/P COIL EMBOLIZATION OF CEREBRAL ANEURYSM: ICD-10-CM

## 2022-01-14 RX ORDER — CLOPIDOGREL BISULFATE 75 MG/1
75 TABLET ORAL DAILY
Qty: 90 TABLET | Refills: 1 | Status: SHIPPED | OUTPATIENT
Start: 2022-01-14

## 2022-03-08 ENCOUNTER — HOSPITAL ENCOUNTER (OUTPATIENT)
Dept: MEDSURG UNIT | Age: 62
Discharge: NURSING, REHAB OR LONG-TERM CARE | End: 2022-03-08
Attending: INTERNAL MEDICINE | Admitting: INTERNAL MEDICINE

## 2022-03-11 LAB
ANION GAP SERPL CALCULATED.3IONS-SCNC: 12 MMOL/L (ref 9–17)
BUN BLDV-MCNC: 32 MG/DL (ref 8–23)
BUN/CREAT BLD: 43 (ref 9–20)
CALCIUM SERPL-MCNC: 9.7 MG/DL (ref 8.6–10.4)
CHLORIDE BLD-SCNC: 93 MMOL/L (ref 98–107)
CO2: 27 MMOL/L (ref 20–31)
CREAT SERPL-MCNC: 0.74 MG/DL (ref 0.5–0.9)
GFR AFRICAN AMERICAN: >60 ML/MIN
GFR NON-AFRICAN AMERICAN: >60 ML/MIN
GFR SERPL CREATININE-BSD FRML MDRD: ABNORMAL ML/MIN/{1.73_M2}
GLUCOSE BLD-MCNC: 124 MG/DL (ref 70–99)
HCT VFR BLD CALC: 29.8 % (ref 36.3–47.1)
HEMOGLOBIN: 9.1 G/DL (ref 11.9–15.1)
MAGNESIUM: 2.4 MG/DL (ref 1.6–2.6)
MCH RBC QN AUTO: 29.1 PG (ref 25.2–33.5)
MCHC RBC AUTO-ENTMCNC: 30.5 G/DL (ref 28.4–34.8)
MCV RBC AUTO: 95.2 FL (ref 82.6–102.9)
NRBC AUTOMATED: 0 PER 100 WBC
PDW BLD-RTO: 14.7 % (ref 11.8–14.4)
PHOSPHORUS: 5 MG/DL (ref 2.6–4.5)
PLATELET # BLD: 387 K/UL (ref 138–453)
PMV BLD AUTO: 10.3 FL (ref 8.1–13.5)
POTASSIUM SERPL-SCNC: 4.8 MMOL/L (ref 3.7–5.3)
RBC # BLD: 3.13 M/UL (ref 3.95–5.11)
SODIUM BLD-SCNC: 132 MMOL/L (ref 135–144)
WBC # BLD: 8.2 K/UL (ref 3.5–11.3)

## 2022-03-11 PROCEDURE — 83735 ASSAY OF MAGNESIUM: CPT

## 2022-03-11 PROCEDURE — 84100 ASSAY OF PHOSPHORUS: CPT

## 2022-03-11 PROCEDURE — 85027 COMPLETE CBC AUTOMATED: CPT

## 2022-03-11 PROCEDURE — 80048 BASIC METABOLIC PNL TOTAL CA: CPT

## 2022-04-11 PROCEDURE — U0003 INFECTIOUS AGENT DETECTION BY NUCLEIC ACID (DNA OR RNA); SEVERE ACUTE RESPIRATORY SYNDROME CORONAVIRUS 2 (SARS-COV-2) (CORONAVIRUS DISEASE [COVID-19]), AMPLIFIED PROBE TECHNIQUE, MAKING USE OF HIGH THROUGHPUT TECHNOLOGIES AS DESCRIBED BY CMS-2020-01-R: HCPCS

## 2022-04-11 PROCEDURE — U0005 INFEC AGEN DETEC AMPLI PROBE: HCPCS

## 2022-04-12 LAB
SARS-COV-2: NORMAL
SARS-COV-2: NOT DETECTED
SOURCE: NORMAL

## 2022-04-19 PROCEDURE — U0003 INFECTIOUS AGENT DETECTION BY NUCLEIC ACID (DNA OR RNA); SEVERE ACUTE RESPIRATORY SYNDROME CORONAVIRUS 2 (SARS-COV-2) (CORONAVIRUS DISEASE [COVID-19]), AMPLIFIED PROBE TECHNIQUE, MAKING USE OF HIGH THROUGHPUT TECHNOLOGIES AS DESCRIBED BY CMS-2020-01-R: HCPCS

## 2022-04-19 PROCEDURE — U0005 INFEC AGEN DETEC AMPLI PROBE: HCPCS

## 2022-04-21 LAB
SARS-COV-2: NORMAL
SARS-COV-2: NOT DETECTED
SOURCE: NORMAL
